# Patient Record
Sex: MALE | Race: OTHER | Employment: FULL TIME | URBAN - METROPOLITAN AREA
[De-identification: names, ages, dates, MRNs, and addresses within clinical notes are randomized per-mention and may not be internally consistent; named-entity substitution may affect disease eponyms.]

---

## 2017-07-07 ENCOUNTER — OFFICE VISIT (OUTPATIENT)
Dept: NEUROLOGY | Age: 57
End: 2017-07-07

## 2017-07-07 VITALS
HEART RATE: 104 BPM | HEIGHT: 71 IN | OXYGEN SATURATION: 95 % | BODY MASS INDEX: 34.16 KG/M2 | RESPIRATION RATE: 18 BRPM | WEIGHT: 244 LBS | SYSTOLIC BLOOD PRESSURE: 130 MMHG | DIASTOLIC BLOOD PRESSURE: 72 MMHG

## 2017-07-07 DIAGNOSIS — G25.0 ESSENTIAL TREMOR: Primary | ICD-10-CM

## 2017-07-07 RX ORDER — PRIMIDONE 50 MG/1
50 TABLET ORAL
Qty: 90 TAB | Refills: 3 | Status: SHIPPED | OUTPATIENT
Start: 2017-07-07 | End: 2017-07-31 | Stop reason: SDUPTHER

## 2017-07-07 NOTE — PATIENT INSTRUCTIONS
10 [facility]   Statement to Patients  April 1, 2014      In an effort to ensure the large volume of patient prescription refills is processed in the most efficient and expeditious manner, we are asking our patients to assist us by calling your Pharmacy for all prescription refills, this will include also your  Mail Order Pharmacy. The pharmacy will contact our office electronically to continue the refill process. Please do not wait until the last minute to call your pharmacy. We need at least 48 hours (2days) to fill prescriptions. We also encourage you to call your pharmacy before going to  your prescription to make sure it is ready. With regard to controlled substance prescription refill requests (narcotic refills) that need to be picked up at our office, we ask your cooperation by providing us with at least 72 hours (3days) notice that you will need a refill. We will not refill narcotic prescription refill requests after 4:00pm on any weekday, Monday through Thursday, or after 2:00pm on Fridays, or on the weekends. We encourage everyone to explore another way of getting your prescription refill request processed using Appscend, our patient web portal through our electronic medical record system. Appscend is an efficient and effective way to communicate your medication request directly to the office and  downloadable as an johann on your smart phone . Appscend also features a review functionality that allows you to view your medication list as well as leave messages for your physician. Are you ready to get connected? If so please review the attatched instructions or speak to any of our staff to get you set up right away! Thank you so much for your cooperation. Should you have any questions please contact our Practice Administrator.     The Physicians and Staff,  [facility]           Please bring all medication bottles, including vitamins, supplements and any over-the-counter medications, to your next office visit.

## 2017-07-07 NOTE — MR AVS SNAPSHOT
Visit Information Date & Time Provider Department Dept. Phone Encounter #  
 7/7/2017  1:40 PM Jamie Miguel MD [facility] at [place] [id number] [phone] Follow-up Instructions Return in about 1 year (around 7/7/2018). Upcoming Health Maintenance Date Due Hepatitis C Screening 1960 DTaP/Tdap/Td series (1 - Tdap) 5/7/1979 FOBT Q 1 YEAR AGE 50-75 5/7/2008 INFLUENZA AGE 9 TO ADULT 8/1/2017 Allergies as of 7/7/2017  Review Complete On: 7/7/2017 By: Jamie Miguel MD  
 No Known Allergies Current Immunizations  Never Reviewed No immunizations on file. Not reviewed this visit Vitals BP Pulse Resp Height(growth percentile) Weight(growth percentile) SpO2  
 130/72 (!) 104 18 5' 11\" (1.803 m) 244 lb (110.7 kg) 95% BMI Smoking Status 34.03 kg/m2 Former Smoker Vitals History BMI and BSA Data Body Mass Index Body Surface Area 34.03 kg/m 2 2.35 m 2 Preferred Pharmacy Pharmacy Name Phone [id number] [place], [address] [phone] Your Updated Medication List  
  
   
This list is accurate as of: 7/7/17  2:11 PM.  Always use your most recent med list.  
  
  
  
  
 aspirin 325 mg tablet Commonly known as:  ASPIRIN Take 325 mg by mouth daily. CIALIS 5 mg tablet Generic drug:  tadalafil Take 5 mg by mouth daily. clotrimazole 1 % topical cream  
Commonly known as:  Tom Huitron Apply  to affected area two (2) times a day. fish oil-dha-epa 1,200-144-216 mg Cap Take  by mouth.  
  
 levocetirizine 5 mg tablet Commonly known as:  Bridgett Ply Take 5 mg by mouth nightly. melatonin Tab tablet Take 5 mg by mouth nightly. NASONEX 50 mcg/actuation nasal spray Generic drug:  mometasone 2 Sprays daily. PANDEL 0.1 % topical cream  
Generic drug:  hydrocortisone probutate Apply  to affected area two (2) times daily as needed for Itching. primidone 50 mg tablet Commonly known as: MYSOLINE Take 1 Tab by mouth nightly. SIMCOR 1,000-40 mg Tm24 Generic drug:  Niacin-Simvastatin Take  by mouth nightly. valsartan 80 mg tab 80 mg, hydrochlorothiazide 12.5 mg cap 12.5 mg Take 1 Dose by mouth every morning. VIMOVO 500-20 mg Tbid Generic drug:  Naproxen-Esomeprazole Mag Take  by mouth two (2) times a day. VITAMIN D3 1,000 unit tablet Generic drug:  cholecalciferol Take 1,000 Units by mouth daily. XANAX 0.25 mg tablet Generic drug:  ALPRAZolam  
Take 0.25 mg by mouth as needed for Anxiety. ZOCOR 10 mg tablet Generic drug:  simvastatin Take  by mouth nightly. Prescriptions Sent to Pharmacy Refills  
 primidone (MYSOLINE) 50 mg tablet 3 Sig: Take 1 Tab by mouth nightly. Class: Normal  
 Pharmacy: [id number]- [address]  #: [phone] Route: Oral  
  
Follow-up Instructions Return in about 1 year (around 7/7/2018). Patient Instructions PRESCRIPTION REFILL POLICY [facility] Statement to Patients April 1, 2014 In an effort to ensure the large volume of patient prescription refills is processed in the most efficient and expeditious manner, we are asking our patients to assist us by calling your Pharmacy for all prescription refills, this will include also your  Mail Order Pharmacy. The pharmacy will contact our office electronically to continue the refill process. Please do not wait until the last minute to call your pharmacy. We need at least 48 hours (2days) to fill prescriptions. We also encourage you to call your pharmacy before going to  your prescription to make sure it is ready.   
 
With regard to controlled substance prescription refill requests (narcotic refills) that need to be picked up at our office, we ask your cooperation by providing us with at least 72 hours (3days) notice that you will need a refill. We will not refill narcotic prescription refill requests after 4:00pm on any weekday, Monday through Thursday, or after 2:00pm on Fridays, or on the weekends. We encourage everyone to explore another way of getting your prescription refill request processed using DeepField, our patient web portal through our electronic medical record system. DeepField is an efficient and effective way to communicate your medication request directly to the office and  downloadable as an johann on your smart phone . DeepField also features a review functionality that allows you to view your medication list as well as leave messages for your physician. Are you ready to get connected? If so please review the attatched instructions or speak to any of our staff to get you set up right away! Thank you so much for your cooperation. Should you have any questions please contact our Practice Administrator. The Physicians and Staff,  [facility] Please bring all medication bottles, including vitamins, supplements and any over-the-counter medications, to your next office visit. Introducing [facility]! [facility] introduces DeepField patient portal. Now you can access parts of your medical record, email your doctor's office, and request medication refills online. 1. In your internet browser, go to [email or web address] 2. Click on the First Time User? Click Here link in the Sign In box. You will see the New Member Sign Up page. 3. Enter your DeepField Access Code exactly as it appears below. You will not need to use this code after youve completed the sign-up process. If you do not sign up before the expiration date, you must request a new code. · DeepField Access Code: [email or web address] Expires: 10/5/2017  1:39 PM 
 
 4. Enter the last four digits of your Social Security Number (xxxx) and Date of Birth (mm/dd/yyyy) as indicated and click Submit. You will be taken to the next sign-up page. 5. Create a Factorli ID. This will be your Factorli login ID and cannot be changed, so think of one that is secure and easy to remember. 6. Create a Factorli password. You can change your password at any time. 7. Enter your Password Reset Question and Answer. This can be used at a later time if you forget your password. 8. Enter your e-mail address. You will receive e-mail notification when new information is available in [address] 9. Click Sign Up. You can now view and download portions of your medical record. 10. Click the Download Summary menu link to download a portable copy of your medical information. If you have questions, please visit the Frequently Asked Questions section of the Factorli website. Remember, Factorli is NOT to be used for urgent needs. For medical emergencies, dial 911. Now available from your iPhone and Android! Please provide this summary of care documentation to your next provider. Your primary care clinician is listed as BALKE BRENNAN. If you have any questions after today's visit, please call [phone].

## 2017-07-31 RX ORDER — PRIMIDONE 50 MG/1
50 TABLET ORAL
Qty: 90 TAB | Refills: 3 | Status: SHIPPED | OUTPATIENT
Start: 2017-07-31 | End: 2017-10-23 | Stop reason: SDUPTHER

## 2017-10-23 RX ORDER — PRIMIDONE 50 MG/1
50 TABLET ORAL
Qty: 90 TAB | Refills: 3 | Status: SHIPPED | OUTPATIENT
Start: 2017-10-23 | End: 2018-09-06 | Stop reason: SDUPTHER

## 2018-09-07 RX ORDER — PRIMIDONE 50 MG/1
TABLET ORAL
Qty: 90 TAB | Refills: 0 | Status: SHIPPED | OUTPATIENT
Start: 2018-09-07 | End: 2018-11-26 | Stop reason: SDUPTHER

## 2018-09-07 NOTE — TELEPHONE ENCOUNTER
I left message for patient informing him that his med has been sent in and that he needs a follow up appt.

## 2018-11-26 RX ORDER — PRIMIDONE 50 MG/1
TABLET ORAL
Qty: 90 TAB | Refills: 0 | Status: SHIPPED | OUTPATIENT
Start: 2018-11-26 | End: 2018-12-21 | Stop reason: SDUPTHER

## 2018-12-21 ENCOUNTER — OFFICE VISIT (OUTPATIENT)
Dept: NEUROLOGY | Age: 58
End: 2018-12-21

## 2018-12-21 VITALS
RESPIRATION RATE: 18 BRPM | SYSTOLIC BLOOD PRESSURE: 122 MMHG | DIASTOLIC BLOOD PRESSURE: 82 MMHG | OXYGEN SATURATION: 96 % | HEIGHT: 71 IN | HEART RATE: 64 BPM | WEIGHT: 247.2 LBS | BODY MASS INDEX: 34.61 KG/M2

## 2018-12-21 DIAGNOSIS — G25.0 ESSENTIAL TREMOR: Primary | ICD-10-CM

## 2018-12-21 RX ORDER — PRIMIDONE 50 MG/1
TABLET ORAL
Qty: 90 TAB | Refills: 3 | Status: SHIPPED | OUTPATIENT
Start: 2018-12-21 | End: 2019-11-22 | Stop reason: SDUPTHER

## 2018-12-21 NOTE — PATIENT INSTRUCTIONS
A Healthy Lifestyle: Care Instructions  Your Care Instructions    A healthy lifestyle can help you feel good, stay at a healthy weight, and have plenty of energy for both work and play. A healthy lifestyle is something you can share with your whole family. A healthy lifestyle also can lower your risk for serious health problems, such as high blood pressure, heart disease, and diabetes. You can follow a few steps listed below to improve your health and the health of your family. Follow-up care is a key part of your treatment and safety. Be sure to make and go to all appointments, and call your doctor if you are having problems. It's also a good idea to know your test results and keep a list of the medicines you take. How can you care for yourself at home? · Do not eat too much sugar, fat, or fast foods. You can still have dessert and treats now and then. The goal is moderation. · Start small to improve your eating habits. Pay attention to portion sizes, drink less juice and soda pop, and eat more fruits and vegetables. ? Eat a healthy amount of food. A 3-ounce serving of meat, for example, is about the size of a deck of cards. Fill the rest of your plate with vegetables and whole grains. ? Limit the amount of soda and sports drinks you have every day. Drink more water when you are thirsty. ? Eat at least 5 servings of fruits and vegetables every day. It may seem like a lot, but it is not hard to reach this goal. A serving or helping is 1 piece of fruit, 1 cup of vegetables, or 2 cups of leafy, raw vegetables. Have an apple or some carrot sticks as an afternoon snack instead of a candy bar. Try to have fruits and/or vegetables at every meal.  · Make exercise part of your daily routine. You may want to start with simple activities, such as walking, bicycling, or slow swimming. Try to be active 30 to 60 minutes every day. You do not need to do all 30 to 60 minutes all at once.  For example, you can exercise 3 times a day for 10 or 20 minutes. Moderate exercise is safe for most people, but it is always a good idea to talk to your doctor before starting an exercise program.  · Keep moving. Anatoliy Bach the lawn, work in the garden, or PowerPot. Take the stairs instead of the elevator at work. · If you smoke, quit. People who smoke have an increased risk for heart attack, stroke, cancer, and other lung illnesses. Quitting is hard, but there are ways to boost your chance of quitting tobacco for good. ? Use nicotine gum, patches, or lozenges. ? Ask your doctor about stop-smoking programs and medicines. ? Keep trying. In addition to reducing your risk of diseases in the future, you will notice some benefits soon after you stop using tobacco. If you have shortness of breath or asthma symptoms, they will likely get better within a few weeks after you quit. · Limit how much alcohol you drink. Moderate amounts of alcohol (up to 2 drinks a day for men, 1 drink a day for women) are okay. But drinking too much can lead to liver problems, high blood pressure, and other health problems. Family health  If you have a family, there are many things you can do together to improve your health. · Eat meals together as a family as often as possible. · Eat healthy foods. This includes fruits, vegetables, lean meats and dairy, and whole grains. · Include your family in your fitness plan. Most people think of activities such as jogging or tennis as the way to fitness, but there are many ways you and your family can be more active. Anything that makes you breathe hard and gets your heart pumping is exercise. Here are some tips:  ? Walk to do errands or to take your child to school or the bus.  ? Go for a family bike ride after dinner instead of watching TV. Where can you learn more? Go to http://addie-arianna.info/. Enter C728 in the search box to learn more about \"A Healthy Lifestyle: Care Instructions. \"  Current as of: December 7, 2017  Content Version: 11.8  © 6677-2613 Healthwise, Incorporated. Care instructions adapted under license by [employer] (which disclaims liability or warranty for this information). If you have questions about a medical condition or this instruction, always ask your healthcare professional. [address] any warranty or liability for your use of this information.

## 2018-12-21 NOTE — PROGRESS NOTES
Neurology Progress Note      HISTORY PROVIDED BY: patient    Chief Complaint:   Chief Complaint   Patient presents with    Tremors      Subjective:   Pt is a 59 y.o. right handed male last seen in clinic on 17 in f/u for essential tremor, presenting with progressive hand tremors noticed with activity, with family history of tremors in his father, well controlled on Primidone. Exam with mild head tremor, bilateral end point tremors, o/w nonfocal and unremarkable. Continued primidone 50 mg nightly. He returns for delayed f/u. He is doing well. Doesn't feel he needs to increase Primidone. Very rarely he feels tremors are worse. He missed 3-4 days of Primidone in early December, did not notice a huge change in tremors, but was distracted due to his mother's passing. No new medical problems or neurological complaints.      Past Medical History:   Diagnosis Date    Altered glucose metabolism     Arthritis     Environmental allergies     Erectile dysfunction     Essential tremor     Hypercholesterolemia     Hypertension     Insomnia     Vitamin D deficiency       Past Surgical History:   Procedure Laterality Date    HX RHINOPLASTY      Deviated septum    HX TONSILLECTOMY        Social History     Socioeconomic History    Marital status:      Spouse name: Not on file    Number of children: Not on file    Years of education: Not on file    Highest education level: Not on file   Social Needs    Financial resource strain: Not on file    Food insecurity - worry: Not on file    Food insecurity - inability: Not on file   [employer] needs - medical: Not on file   [employer] needs - non-medical: Not on file   Occupational History    Occupation: Sales   Tobacco Use    Smoking status: Former Smoker     Packs/day: 1.00     Years: 40.00     Pack years: 40.00     Last attempt to quit: 2013     Years since quittin.4    Smokeless tobacco: Current User     Last attempt to quit: 2013    Tobacco comment: E CIGS OCCASIONLY   Substance and Sexual Activity    Alcohol use: Yes     Comment: RARELY    Drug use: No    Sexual activity: Not on file   Other Topics Concern    Not on file   Social History Narrative     lives in [place] with his wife     Family History   Problem Relation Age of Onset    Thyroid Disease Mother     Cancer Father         COLON  68years old,    Corrie Jurado Stroke Father     Dementia Father         diagnosed 67years old   Corrie Jurado Diabetes Father     Tremors Father     Diabetes Brother     Cancer Brother         Leukemia    No Known Problems Brother     No Known Problems Daughter          Objective:   ROS: Per HPI or PMH, o/w reviewed and neg    No Known Allergies     Meds:  Outpatient Medications Prior to Visit   Medication Sig Dispense Refill    primidone (MYSOLINE) 50 mg tablet TAKE 1 TABLET BY MOUTH  NIGHTLY 90 Tab 0    valsartan 80 mg tab 80 mg, hydrochlorothiazide 12.5 mg cap 12.5 mg Take 1 Dose by mouth every morning.  Naproxen-Esomeprazole Mag (VIMOVO) 500-20 mg TbID Take  by mouth two (2) times a day.  Niacin-Simvastatin [facility]) 1,000-40 mg TM24 Take  by mouth nightly.  levocetirizine (XYZAL) 5 mg tablet Take 5 mg by mouth nightly.  cholecalciferol (VITAMIN D3) 1,000 unit tablet Take 1,000 Units by mouth daily.  fish oil-dha-epa 1,200-144-216 mg cap Take  by mouth.  aspirin (ASPIRIN) 325 mg tablet Take 325 mg by mouth daily.  melatonin 5 mg tab Take 5 mg by mouth nightly.  mometasone (NASONEX) 50 mcg/actuation nasal spray 2 Sprays daily.  clotrimazole (LOTRIMIN) 1 % topical cream Apply  to affected area two (2) times a day.  hydrocortisone buteprate (PANDEL) 0.1 % topical cream Apply  to affected area two (2) times daily as needed for Itching.  tadalafil (CIALIS) 5 mg tablet Take 5 mg by mouth daily.  ALPRAZolam (XANAX) 0.25 mg tablet Take 0.25 mg by mouth as needed for Anxiety.  simvastatin (ZOCOR) 10 mg tablet Take  by mouth nightly. No facility-administered medications prior to visit. Imaging:  MRI Results (most recent):  No results found for this or any previous visit. CT Results (most recent):  Results from Hospital Encounter encounter on 11/20/13   CTA CHEST W WO CONT    Narrative **Final Report**       ICD Codes / Adm. Diagnosis: 794740   / Shoulder Pain    Examination:  CT CHEST ANGIOGRAPHY  - [id number] - Nov 20 2013  6:53AM  Accession No:  [id number]  Reason:  Chest pain R/O PE      REPORT:  Clinical indication: Chest pain and shortness of breath. Localizer obtained without contrast at the level of the pulmonary arteries. Fast injection rate of 80 cc Optiray-350 with review of the raw data and MIP   reconstructions. There is no pulmonary emboli. Bibasilar atelectasis. No parenchymal mass,   pericardial pleural effusion or shift or pneumothorax. IMPRESSION: No acute changes. Signing/Reading Doctor: Deirdre Ochoa ([id number])    Approved: Deirdre Ochoa ([id number])  Nov 20 2013  7:14AM                                      Reviewed records in [facility] and media tab today    Lab Review   Results for orders placed or performed during the hospital encounter of 48/80/80   METABOLIC PANEL, COMPREHENSIVE   Result Value Ref Range    Sodium 139 136 - 145 mmol/L    Potassium 4.2 3.5 - 5.1 mmol/L    Chloride 102 97 - 108 mmol/L    CO2 29 21 - 32 mmol/L    Anion gap 8 5 - 15 mmol/L    Glucose 87 65 - 100 mg/dL    BUN 14 6 - 20 MG/DL    Creatinine 0.55 0.45 - 1.15 MG/DL    BUN/Creatinine ratio 25 (H) 12 - 20      GFR est AA >60 >60 ml/min/1.73m2    GFR est non-AA >60 >60 ml/min/1.73m2    Calcium 9.6 8.5 - 10.1 MG/DL    Bilirubin, total 0.5 0.2 - 1.0 MG/DL    ALT (SGPT) 42 12 - 78 U/L    AST (SGOT) 19 15 - 37 U/L    Alk.  phosphatase 67 45 - 117 U/L    Protein, total 7.4 6.4 - 8.2 g/dL    Albumin 4.5 3.5 - 5.0 g/dL    Globulin 2.9 2.0 - 4.0 g/dL    A-G Ratio 1.6 1.1 - 2.2     CBC WITH AUTOMATED DIFF   Result Value Ref Range    WBC 5.3 4.1 - 11.1 K/uL    RBC 4.90 4. 10 - 5.70 M/uL    HGB 15.3 12.1 - 17.0 g/dL    HCT 43.8 36.6 - 50.3 %    MCV 89.4 80.0 - 99.0 FL    MCH 31.2 26.0 - 34.0 PG    MCHC 34.9 30.0 - 36.5 g/dL    RDW 13.3 11.5 - 14.5 %    PLATELET 314 761 - 024 K/uL    NEUTROPHILS 58 32 - 75 %    LYMPHOCYTES 33 12 - 49 %    MONOCYTES 8 5 - 13 %    EOSINOPHILS 1 0 - 7 %    BASOPHILS 0 0 - 1 %    ABS. NEUTROPHILS 3.0 1.8 - 8.0 K/UL    ABS. LYMPHOCYTES 1.7 0.8 - 3.5 K/UL    ABS. MONOCYTES 0.4 0.0 - 1.0 K/UL    ABS. EOSINOPHILS 0.1 0.0 - 0.4 K/UL    ABS. BASOPHILS 0.0 0.0 - 0.1 K/UL   URINALYSIS W/ REFLEX CULTURE   Result Value Ref Range    Color YELLOW/STRAW      Appearance CLEAR CLEAR      Specific gravity 1.014 1.003 - 1.030      pH (UA) 7.0 5.0 - 8.0      Protein NEGATIVE  NEG mg/dL    Glucose NEGATIVE  NEG mg/dL    Ketone NEGATIVE  NEG mg/dL    Bilirubin NEGATIVE  NEG      Blood NEGATIVE  NEG      Urobilinogen 0.2 0.2 - 1.0 EU/dL    Nitrites NEGATIVE  NEG      Leukocyte Esterase NEGATIVE  NEG      UA:UC IF INDICATED CULTURE NOT INDICATED BY UA RESULT CNI      WBC 0-4 0 - 4 /hpf    RBC 0-5 0 - 5 /hpf    Epithelial cells FEW FEW /lpf    Bacteria NEGATIVE  NEG /hpf    Hyaline cast 0-2 0 - 2 /lpf   EKG, 12 LEAD, INITIAL   Result Value Ref Range    Ventricular Rate 70 BPM    Atrial Rate 70 BPM    P-R Interval 176 ms    QRS Duration 90 ms    Q-T Interval 394 ms    QTC Calculation (Bezet) 425 ms    Calculated P Axis 7 degrees    Calculated R Axis -6 degrees    Calculated T Axis 6 degrees    Diagnosis       Normal sinus rhythm  Normal ECG  When compared with ECG of 20-NOV-2013 04:34,  No significant change was found  Confirmed by Quynh Head M.D., Domingo Monday ([id number]) on 8/17/2015 10:05:31 PM          Exam:  Visit Vitals  /82   Pulse 64   Resp 18   Ht 5' 11\" (1.803 m)   Wt 112.1 kg (247 lb 3.2 oz)   SpO2 96%   BMI 34.48 kg/m²     General:  Alert, cooperative, no distress. Head:  Normocephalic, without obvious abnormality, atraumatic. Respiratory:  Heart:   Non labored breathing  Regular rate and rhythm, no murmurs   Neck:   2+ carotids, no bruits   Extremities: Warm, no cyanosis or edema. Pulses: 2+ radial pulses. Neurologic:  MS: Alert and oriented x 4, speech intact. Language intact. Attention and fund of knowledge appropriate. Recent and remote memory intact. Cranial Nerves:  II: visual fields Full to confrontation   II: pupils PERRL   II: optic disc    III,VII: ptosis none   III,IV,VI: extraocular muscles  EOMI, no nystagmus or diplopia   V: facial light touch sensation     VII: facial muscle function   symmetric   VIII: hearing intact   IX: soft palate elevation  Elevates sym   XI: trapezius strength     XI: sternocleidomastoid strength    XII: tongue  midline     Motor: normal bulk and tone, head tremor, mild. Bilateral end point tremors. Strength: 5/5 throughout, no PD  Sensory:  Coordination: FTN and HTS intact, FREEMAN intact  Gait: normal gait, turns normally, normal arm swing  Reflexes: 2+ symmetric           Assessment/Plan   Pt is a 59 y.o. right handed male with essential tremor, presenting with progressive hand tremors noticed with activity, head tremor noticed on exam, with family history of tremors in his father, well controlled on Primidone. Exam with mild head tremor, bilateral end point tremors, o/w nonfocal and unremarkable. Continue primidone 50 mg nightly. Follow-up in 1 year, instructed to call in the interim with any questions or concerns. ICD-10-CM ICD-9-CM    1. Essential tremor G25.0 333.1        Signed:   Kiki Velasquez MD  12/21/2018

## 2018-12-21 NOTE — PROGRESS NOTES
Mr. Sivan Kelly is here today to follow up tremor. His symptoms are stable. Depression screening done on patient.

## 2019-11-19 RX ORDER — PRIMIDONE 50 MG/1
TABLET ORAL
Qty: 90 TAB | Refills: 3 | OUTPATIENT
Start: 2019-11-19

## 2019-11-22 ENCOUNTER — OFFICE VISIT (OUTPATIENT)
Dept: NEUROLOGY | Age: 59
End: 2019-11-22

## 2019-11-22 VITALS
HEIGHT: 71 IN | RESPIRATION RATE: 18 BRPM | HEART RATE: 91 BPM | DIASTOLIC BLOOD PRESSURE: 60 MMHG | SYSTOLIC BLOOD PRESSURE: 110 MMHG | WEIGHT: 251.2 LBS | BODY MASS INDEX: 35.17 KG/M2 | OXYGEN SATURATION: 97 %

## 2019-11-22 DIAGNOSIS — G25.0 ESSENTIAL TREMOR: Primary | ICD-10-CM

## 2019-11-22 RX ORDER — PRIMIDONE 50 MG/1
100 TABLET ORAL
Qty: 180 TAB | Refills: 3 | Status: SHIPPED | OUTPATIENT
Start: 2019-11-22 | End: 2020-11-06

## 2019-11-22 RX ORDER — FEXOFENADINE HCL 60 MG
TABLET ORAL
COMMUNITY
End: 2020-11-23

## 2019-11-22 NOTE — PROGRESS NOTES
Chief Complaint   Patient presents with    Follow-up     f/u tremors noted about the same large intake of caffine daily    Tremors     Visit Vitals  /60 (BP 1 Location: Right arm, BP Patient Position: Sitting)   Pulse 91   Resp 18   Ht 5' 11\" (1.803 m)   Wt 113.9 kg (251 lb 3.2 oz)   SpO2 97%   BMI 35.04 kg/m²

## 2019-11-22 NOTE — LETTER
11/22/19 Patient: Tyrell Rome YOB: 1960 Date of Visit: 11/22/2019 [address] [facility] [facility] [place] [address] VIA Facsimile: [phone] Dear [address] [place] [address],[facility] [address] [facility], MD, Thank you for referring Mr. Tyrell Rome to [address] [place] [address] for evaluation. My notes for this consultation are attached. If you have questions, please do not hesitate to call me. I look forward to following your patient along with you. Sincerely, Ethelle Dubin, MD

## 2019-11-22 NOTE — PROGRESS NOTES
Neurology Progress Note      HISTORY PROVIDED BY: patient    Chief Complaint:   Chief Complaint   Patient presents with    Follow-up     f/u tremors noted about the same large intake of caffine daily    Tremors      Subjective:   Pt is a 62 y.o. right handed male last seen in clinic on 12/21/18 for essential tremor, presenting with progressive hand tremors noticed with activity, head tremor noticed on exam, with family history of tremors in his father, well controlled on Primidone at last visit. Exam with mild head tremor, bilateral end point tremors, o/w nonfocal and unremarkable. Continued primidone 50 mg nightly. He returns for f/u. He feels like his tremor is worse, but notes he drinks a 20oz cup of coffee in AM and has another and then has caffeinated tea. He doesn't like water. He has noticed trouble with the pointer on the computer screen. His interested in increasing dose. No new health issues. No surgeries.      Past Medical History:   Diagnosis Date    Altered glucose metabolism     Arthritis     Environmental allergies     Erectile dysfunction     Essential tremor     Hypercholesterolemia     Hypertension     Insomnia     Vitamin D deficiency       Past Surgical History:   Procedure Laterality Date    HX RHINOPLASTY      Deviated septum    HX TONSILLECTOMY        Social History     Socioeconomic History    Marital status:      Spouse name: Not on file    Number of children: Not on file    Years of education: Not on file    Highest education level: Not on file   Occupational History    Occupation: Sales   Social Needs    Financial resource strain: Not on file    Food insecurity:     Worry: Not on file     Inability: Not on file    Transportation needs:     Medical: Not on file     Non-medical: Not on file   Tobacco Use    Smoking status: Former Smoker     Packs/day: 1.00     Years: 40.00     Pack years: 40.00     Last attempt to quit: 7/4/2013     Years since quitting: 6. 3    Smokeless tobacco: Current User     Last attempt to quit: 2013    Tobacco comment: E CIGS OCCASIONLY   Substance and Sexual Activity    Alcohol use: Yes     Comment: RARELY    Drug use: No    Sexual activity: Not on file   Lifestyle    Physical activity:     Days per week: Not on file     Minutes per session: Not on file    Stress: Not on file   Relationships    Social connections:     Talks on phone: Not on file     Gets together: Not on file     Attends Christian service: Not on file     Active member of club or organization: Not on file     Attends meetings of clubs or organizations: Not on file     Relationship status: Not on file    Intimate partner violence:     Fear of current or ex partner: Not on file     Emotionally abused: Not on file     Physically abused: Not on file     Forced sexual activity: Not on file   Other Topics Concern    Not on file   Social History Narrative     lives in [address] [place] [address] with his wife     Family History   Problem Relation Age of Onset    Thyroid Disease Mother    Radha Herrmann Other Mother         Dec 81yo, unknown cause    Cancer Father         COLON  68years old,     Stroke Father     Dementia Father         diagnosed 67years old   Radha Herrmann Diabetes Father     Tremors Father     Diabetes Brother     Cancer Brother         Leukemia    No Known Problems Brother     No Known Problems Daughter          Objective:   ROS: Per HPI or PMH, o/w reviewed and neg    No Known Allergies     Meds:  Outpatient Medications Prior to Visit   Medication Sig Dispense Refill    fexofenadine (ALLEGRA) 60 mg tablet Take  by mouth.  primidone (MYSOLINE) 50 mg tablet TAKE 1 TABLET BY MOUTH  NIGHTLY 90 Tab 3    valsartan 80 mg tab 80 mg, hydrochlorothiazide 12.5 mg cap 12.5 mg Take 1 Dose by mouth every morning.  Naproxen-Esomeprazole Mag (VIMOVO) 500-20 mg TbID Take  by mouth two (2) times a day.       cholecalciferol (VITAMIN D3) 1,000 unit tablet Take 1,000 Units by mouth daily.  fish oil-dha-epa 1,200-144-216 mg cap Take  by mouth.  aspirin (ASPIRIN) 325 mg tablet Take 325 mg by mouth daily.  melatonin 5 mg tab Take 5 mg by mouth nightly.  clotrimazole (LOTRIMIN) 1 % topical cream Apply  to affected area two (2) times a day.  hydrocortisone buteprate (PANDEL) 0.1 % topical cream Apply  to affected area two (2) times daily as needed for Itching.  tadalafil (CIALIS) 5 mg tablet Take 5 mg by mouth daily.  simvastatin (ZOCOR) 10 mg tablet Take  by mouth nightly.  Niacin-Simvastatin [facility]) 1,000-40 mg TM24 Take  by mouth nightly.  levocetirizine (XYZAL) 5 mg tablet Take 5 mg by mouth nightly.  mometasone (NASONEX) 50 mcg/actuation nasal spray 2 Sprays daily.  ALPRAZolam (XANAX) 0.25 mg tablet Take 0.25 mg by mouth as needed for Anxiety. No facility-administered medications prior to visit. Imaging:  MRI Results (most recent):  No results found for this or any previous visit. CT Results (most recent):  Results from Hospital Encounter encounter on 11/20/13   CTA CHEST W WO CONT    Narrative **Final Report**       ICD Codes / Adm. Diagnosis: 343718   / Shoulder Pain    Examination:  CT CHEST ANGIOGRAPHY  - [id number] - Nov 20 2013  6:53AM  Accession No:  [id number]  Reason:  Chest pain R/O PE      REPORT:  Clinical indication: Chest pain and shortness of breath. Localizer obtained without contrast at the level of the pulmonary arteries. Fast injection rate of 80 cc Optiray-350 with review of the raw data and MIP   reconstructions. There is no pulmonary emboli. Bibasilar atelectasis. No parenchymal mass,   pericardial pleural effusion or shift or pneumothorax. IMPRESSION: No acute changes.             Signing/Reading Doctor: Renuka Pires ([id number])    Approved: Renuka Pires ([id number])  Nov 20 2013  7:14AM                                      Reviewed records in [facility] tab today    Lab Review   Results for orders placed or performed during the hospital encounter of 80/35/78   METABOLIC PANEL, COMPREHENSIVE   Result Value Ref Range    Sodium 139 136 - 145 mmol/L    Potassium 4.2 3.5 - 5.1 mmol/L    Chloride 102 97 - 108 mmol/L    CO2 29 21 - 32 mmol/L    Anion gap 8 5 - 15 mmol/L    Glucose 87 65 - 100 mg/dL    BUN 14 6 - 20 MG/DL    Creatinine 0.55 0.45 - 1.15 MG/DL    BUN/Creatinine ratio 25 (H) 12 - 20      GFR est AA >60 >60 ml/min/1.73m2    GFR est non-AA >60 >60 ml/min/1.73m2    Calcium 9.6 8.5 - 10.1 MG/DL    Bilirubin, total 0.5 0.2 - 1.0 MG/DL    ALT (SGPT) 42 12 - 78 U/L    AST (SGOT) 19 15 - 37 U/L    Alk. phosphatase 67 45 - 117 U/L    Protein, total 7.4 6.4 - 8.2 g/dL    Albumin 4.5 3.5 - 5.0 g/dL    Globulin 2.9 2.0 - 4.0 g/dL    A-G Ratio 1.6 1.1 - 2.2     CBC WITH AUTOMATED DIFF   Result Value Ref Range    WBC 5.3 4.1 - 11.1 K/uL    RBC 4.90 4. 10 - 5.70 M/uL    HGB 15.3 12.1 - 17.0 g/dL    HCT 43.8 36.6 - 50.3 %    MCV 89.4 80.0 - 99.0 FL    MCH 31.2 26.0 - 34.0 PG    MCHC 34.9 30.0 - 36.5 g/dL    RDW 13.3 11.5 - 14.5 %    PLATELET 330 555 - 071 K/uL    NEUTROPHILS 58 32 - 75 %    LYMPHOCYTES 33 12 - 49 %    MONOCYTES 8 5 - 13 %    EOSINOPHILS 1 0 - 7 %    BASOPHILS 0 0 - 1 %    ABS. NEUTROPHILS 3.0 1.8 - 8.0 K/UL    ABS. LYMPHOCYTES 1.7 0.8 - 3.5 K/UL    ABS. MONOCYTES 0.4 0.0 - 1.0 K/UL    ABS. EOSINOPHILS 0.1 0.0 - 0.4 K/UL    ABS.  BASOPHILS 0.0 0.0 - 0.1 K/UL   URINALYSIS W/ REFLEX CULTURE   Result Value Ref Range    Color YELLOW/STRAW      Appearance CLEAR CLEAR      Specific gravity 1.014 1.003 - 1.030      pH (UA) 7.0 5.0 - 8.0      Protein NEGATIVE  NEG mg/dL    Glucose NEGATIVE  NEG mg/dL    Ketone NEGATIVE  NEG mg/dL    Bilirubin NEGATIVE  NEG      Blood NEGATIVE  NEG      Urobilinogen 0.2 0.2 - 1.0 EU/dL    Nitrites NEGATIVE  NEG      Leukocyte Esterase NEGATIVE  NEG      UA:UC IF INDICATED CULTURE NOT INDICATED BY UA RESULT CNI      WBC 0-4 0 - 4 /hpf    RBC 0-5 0 - 5 /hpf    Epithelial cells FEW FEW /lpf    Bacteria NEGATIVE  NEG /hpf    Hyaline cast 0-2 0 - 2 /lpf   EKG, 12 LEAD, INITIAL   Result Value Ref Range    Ventricular Rate 70 BPM    Atrial Rate 70 BPM    P-R Interval 176 ms    QRS Duration 90 ms    Q-T Interval 394 ms    QTC Calculation (Bezet) 425 ms    Calculated P Axis 7 degrees    Calculated R Axis -6 degrees    Calculated T Axis 6 degrees    Diagnosis       Normal sinus rhythm  Normal ECG  When compared with ECG of 20-NOV-2013 04:34,  No significant change was found  Confirmed by Dinora King M.D., Prudence Meals ([id number]) on 8/17/2015 10:05:31 PM          Exam:  Visit Vitals  /60 (BP 1 Location: Right arm, BP Patient Position: Sitting)   Pulse 91   Resp 18   Ht 5' 11\" (1.803 m)   Wt 113.9 kg (251 lb 3.2 oz)   SpO2 97%   BMI 35.04 kg/m²     General:  Alert, cooperative, no distress. Head:  Normocephalic, without obvious abnormality, atraumatic. Respiratory:  Heart:   Non labored breathing  Regular rate and rhythm, no murmurs   Neck:   2+ carotids, no bruits   Extremities: Warm, no cyanosis or edema. Pulses: 2+ radial pulses. Neurologic:  MS: Alert and oriented x 4, speech - mild vocal tremor. Language intact. Attention and fund of knowledge appropriate. Recent and remote memory intact. Cranial Nerves:  II: visual fields Full to confrontation   II: pupils PERRL   II: optic disc    III,VII: ptosis none   III,IV,VI: extraocular muscles  EOMI, no nystagmus or diplopia   V: facial light touch sensation     VII: facial muscle function   symmetric   VIII: hearing intact   IX: soft palate elevation  Elevates sym   XI: trapezius strength     XI: sternocleidomastoid strength    XII: tongue  midline     Motor: normal bulk and tone, head tremor, mild. Bilateral end point tremors.  Strength: 5/5 throughout, no PD  Sensory:  Coordination: FTN and FREEMAN intact  Gait: normal gait  Reflexes: 2+ symmetric           Assessment/Plan   Pt is a 62 y.o. right handed male with essential tremor, presenting with progressive hand tremors noticed with activity, head tremor noticed on exam, with family history of tremors in his father. Pt appreciating worsening of tremors. Exam with mild head tremor, slightly worse than it was and new mild vocal tremor, bilateral end point tremors, o/w nonfocal and unremarkable. Discussed weaning caffeine to decrease tremors, and for other health benefits. Increase primidone to 100 mg nightly or 50mg bid, whichever he prefers. Follow-up in 1 year, instructed to call in the interim with any questions or concerns. ICD-10-CM ICD-9-CM    1. Essential tremor G25.0 333.1        Signed:   Joel Boxer, MD  11/22/2019

## 2019-11-26 ENCOUNTER — TELEPHONE (OUTPATIENT)
Dept: NEUROLOGY | Age: 59
End: 2019-11-26

## 2019-11-26 NOTE — TELEPHONE ENCOUNTER
Pharmacy calling because the number they have for the pt is not a working number. They are unable to contact him to send his Primidone.

## 2019-11-27 NOTE — TELEPHONE ENCOUNTER
Called patient to notify [facility] mail pharmacy with updated information to receive Primidone.

## 2020-11-06 RX ORDER — PRIMIDONE 50 MG/1
TABLET ORAL
Qty: 180 TAB | Refills: 0 | Status: SHIPPED | OUTPATIENT
Start: 2020-11-06 | End: 2020-11-23 | Stop reason: SDUPTHER

## 2020-11-23 ENCOUNTER — OFFICE VISIT (OUTPATIENT)
Dept: NEUROLOGY | Age: 60
End: 2020-11-23
Payer: COMMERCIAL

## 2020-11-23 VITALS
WEIGHT: 250 LBS | HEIGHT: 71 IN | SYSTOLIC BLOOD PRESSURE: 134 MMHG | BODY MASS INDEX: 35 KG/M2 | OXYGEN SATURATION: 98 % | HEART RATE: 84 BPM | DIASTOLIC BLOOD PRESSURE: 82 MMHG

## 2020-11-23 DIAGNOSIS — G25.0 ESSENTIAL TREMOR: Primary | ICD-10-CM

## 2020-11-23 PROCEDURE — 99213 OFFICE O/P EST LOW 20 MIN: CPT | Performed by: PSYCHIATRY & NEUROLOGY

## 2020-11-23 RX ORDER — PRIMIDONE 50 MG/1
TABLET ORAL
Qty: 180 TAB | Refills: 3 | Status: SHIPPED | OUTPATIENT
Start: 2020-11-23 | End: 2021-11-23 | Stop reason: SDUPTHER

## 2020-11-23 RX ORDER — CETIRIZINE HCL 10 MG
TABLET ORAL
COMMUNITY
End: 2021-11-23

## 2020-11-23 RX ORDER — VALSARTAN AND HYDROCHLOROTHIAZIDE 80; 12.5 MG/1; MG/1
TABLET, FILM COATED ORAL
COMMUNITY
Start: 2020-09-30

## 2020-11-23 RX ORDER — IBUPROFEN AND FAMOTIDINE 800; 26.6 MG/1; MG/1
1 TABLET, COATED ORAL 3 TIMES DAILY
COMMUNITY
End: 2021-11-23

## 2020-11-23 NOTE — PROGRESS NOTES
Neurology Progress Note      HISTORY PROVIDED BY: patient    Chief Complaint:   Chief Complaint   Patient presents with    Follow-up     essential tremor \" I have not noticed any change, but my PCP seemed to think it seems better. \"      Subjective:   Pt is a 56 y.o. right handed male last seen 11/22/19 in f/u for essential tremor, presenting with progressive hand tremors noticed with activity, head tremor noticed on exam, with family history of tremors in his father. Pt appreciating worsening of tremors. Exam with mild head tremor, slightly worse than it was and new mild vocal tremor, bilateral end point tremors, o/w nonfocal and unremarkable. Discussed weaning caffeine to decrease tremors, and for other health benefits. Increased primidone to 100 mg nightly or 50mg bid, whichever he prefers. He returns for planned f/u. 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 He has been seeing his PCP every 6 months, and Dr. Estrellita Pal spontaneously mentioned that his tremors seem better. The pt states, he just doesn't notice them, not interfering with activities. He is taking the Primidone 100mg qhs. He has not noticed voice or head tremor.      Past Medical History:   Diagnosis Date    Altered glucose metabolism     Arthritis     Environmental allergies     Erectile dysfunction     Essential tremor     Hypercholesterolemia     Hypertension     Insomnia     Vitamin D deficiency       Past Surgical History:   Procedure Laterality Date    HX RHINOPLASTY      Deviated septum    HX TONSILLECTOMY        Social History     Socioeconomic History    Marital status:      Spouse name: Not on file    Number of children: Not on file    Years of education: Not on file    Highest education level: Not on file   Occupational History    Occupation: Sales   Social Needs    Financial resource strain: Not on file    Food insecurity     Worry: Not on file Inability: Not on file    Transportation needs     Medical: Not on file     Non-medical: Not on file   Tobacco Use    Smoking status: Former Smoker     Packs/day: 1.00     Years: 40.00     Pack years: 40.00     Last attempt to quit: 2013     Years since quittin.3    Smokeless tobacco: Current User     Last attempt to quit: 2013    Tobacco comment: E CIGS OCCASIONLY   Substance and Sexual Activity    Alcohol use: Yes     Comment: RARELY    Drug use: No    Sexual activity: Not on file   Lifestyle    Physical activity     Days per week: Not on file     Minutes per session: Not on file    Stress: Not on file   Relationships    Social connections     Talks on phone: Not on file     Gets together: Not on file     Attends Latter day service: Not on file     Active member of club or organization: Not on file     Attends meetings of clubs or organizations: Not on file     Relationship status: Not on file    Intimate partner violence     Fear of current or ex partner: Not on file     Emotionally abused: Not on file     Physically abused: Not on file     Forced sexual activity: Not on file   Other Topics Concern    Not on file   Social History Narrative     lives in [place] with his wife     Family History   Problem Relation Age of Onset    Thyroid Disease Mother    Ferhat.Frankel Other Mother         Dec 83yo, unknown cause    Cancer Father         COLON  68years old,     Stroke Father     Dementia Father         diagnosed 67years old   Ferhat.Frankel Diabetes Father     Tremors Father     Diabetes Brother     Cancer Brother         Leukemia    No Known Problems Brother     No Known Problems Daughter          Objective:   ROS: Per HPI or PMH, o/w reviewed and neg    No Known Allergies     Meds:  Outpatient Medications Prior to Visit   Medication Sig Dispense Refill    valsartan-hydroCHLOROthiazide (DIOVAN-HCT) 80-12.5 mg per tablet TAKE 1 TABLET BY MOUTH ONCE A DAY IN THE MORNING 90 DAYS      ibuprofen-famotidine (Duexis) 800-26.6 mg tab Take 1 Tab by mouth three (3) times daily.  primidone (MYSOLINE) 50 mg tablet TAKE 2 TABLETS NIGHTLY 180 Tab 0    Niacin-Simvastatin (SIMCOR) 1,000-40 mg TM24 Take  by mouth nightly.  levocetirizine (XYZAL) 5 mg tablet Take 5 mg by mouth nightly.  cholecalciferol (VITAMIN D3) 1,000 unit tablet Take 1,000 Units by mouth daily.  fish oil-dha-epa 1,200-144-216 mg cap Take  by mouth.  aspirin (ASPIRIN) 325 mg tablet Take 325 mg by mouth daily.  melatonin 5 mg tab Take 5 mg by mouth nightly.  mometasone (NASONEX) 50 mcg/actuation nasal spray 2 Sprays daily.  hydrocortisone buteprate (PANDEL) 0.1 % topical cream Apply  to affected area two (2) times daily as needed for Itching.  tadalafil (CIALIS) 5 mg tablet Take 5 mg by mouth daily.  ALPRAZolam (XANAX) 0.25 mg tablet Take 0.25 mg by mouth as needed for Anxiety.  fexofenadine (ALLEGRA) 60 mg tablet Take  by mouth.  valsartan 80 mg tab 80 mg, hydrochlorothiazide 12.5 mg cap 12.5 mg Take 1 Dose by mouth every morning.  Naproxen-Esomeprazole Mag (VIMOVO) 500-20 mg TbID Take  by mouth two (2) times a day.  clotrimazole (LOTRIMIN) 1 % topical cream Apply  to affected area two (2) times a day.  simvastatin (ZOCOR) 10 mg tablet Take  by mouth nightly. No facility-administered medications prior to visit. Imaging:  MRI Results (most recent):  No results found for this or any previous visit. CT Results (most recent):  Results from Hospital Encounter encounter on 11/20/13   CTA CHEST W WO CONT    Narrative **Final Report**       ICD Codes / Adm. Diagnosis: 289596   / Shoulder Pain    Examination:  CT CHEST ANGIOGRAPHY  - [id number] - Nov 20 2013  6:53AM  Accession No:  [id number]  Reason:  Chest pain R/O PE      REPORT:  Clinical indication: Chest pain and shortness of breath.     Localizer obtained without contrast at the level of the pulmonary arteries. Fast injection rate of 80 cc Optiray-350 with review of the raw data and MIP   reconstructions. There is no pulmonary emboli. Bibasilar atelectasis. No parenchymal mass,   pericardial pleural effusion or shift or pneumothorax. IMPRESSION: No acute changes. Signing/Reading Doctor: Tristan Lazar ([id number])    Approved: Tristan Lazar ([id number])  Nov 20 2013  7:14AM                                      Reviewed records in [address] [place] [address] tab today    Lab Review   Results for orders placed or performed during the hospital encounter of 39/59/17   METABOLIC PANEL, COMPREHENSIVE   Result Value Ref Range    Sodium 139 136 - 145 mmol/L    Potassium 4.2 3.5 - 5.1 mmol/L    Chloride 102 97 - 108 mmol/L    CO2 29 21 - 32 mmol/L    Anion gap 8 5 - 15 mmol/L    Glucose 87 65 - 100 mg/dL    BUN 14 6 - 20 MG/DL    Creatinine 0.55 0.45 - 1.15 MG/DL    BUN/Creatinine ratio 25 (H) 12 - 20      GFR est AA >60 >60 ml/min/1.73m2    GFR est non-AA >60 >60 ml/min/1.73m2    Calcium 9.6 8.5 - 10.1 MG/DL    Bilirubin, total 0.5 0.2 - 1.0 MG/DL    ALT (SGPT) 42 12 - 78 U/L    AST (SGOT) 19 15 - 37 U/L    Alk. phosphatase 67 45 - 117 U/L    Protein, total 7.4 6.4 - 8.2 g/dL    Albumin 4.5 3.5 - 5.0 g/dL    Globulin 2.9 2.0 - 4.0 g/dL    A-G Ratio 1.6 1.1 - 2.2     CBC WITH AUTOMATED DIFF   Result Value Ref Range    WBC 5.3 4.1 - 11.1 K/uL    RBC 4.90 4. 10 - 5.70 M/uL    HGB 15.3 12.1 - 17.0 g/dL    HCT 43.8 36.6 - 50.3 %    MCV 89.4 80.0 - 99.0 FL    MCH 31.2 26.0 - 34.0 PG    MCHC 34.9 30.0 - 36.5 g/dL    RDW 13.3 11.5 - 14.5 %    PLATELET 140 143 - 755 K/uL    NEUTROPHILS 58 32 - 75 %    LYMPHOCYTES 33 12 - 49 %    MONOCYTES 8 5 - 13 %    EOSINOPHILS 1 0 - 7 %    BASOPHILS 0 0 - 1 %    ABS. NEUTROPHILS 3.0 1.8 - 8.0 K/UL    ABS. LYMPHOCYTES 1.7 0.8 - 3.5 K/UL    ABS. MONOCYTES 0.4 0.0 - 1.0 K/UL    ABS. EOSINOPHILS 0.1 0.0 - 0.4 K/UL    ABS.  BASOPHILS 0.0 0.0 - 0.1 K/UL   URINALYSIS W/ REFLEX CULTURE Specimen: Urine   Result Value Ref Range    Color YELLOW/STRAW      Appearance CLEAR CLEAR      Specific gravity 1.014 1.003 - 1.030      pH (UA) 7.0 5.0 - 8.0      Protein NEGATIVE  NEG mg/dL    Glucose NEGATIVE  NEG mg/dL    Ketone NEGATIVE  NEG mg/dL    Bilirubin NEGATIVE  NEG      Blood NEGATIVE  NEG      Urobilinogen 0.2 0.2 - 1.0 EU/dL    Nitrites NEGATIVE  NEG      Leukocyte Esterase NEGATIVE  NEG      UA:UC IF INDICATED CULTURE NOT INDICATED BY UA RESULT CNI      WBC 0-4 0 - 4 /hpf    RBC 0-5 0 - 5 /hpf    Epithelial cells FEW FEW /lpf    Bacteria NEGATIVE  NEG /hpf    Hyaline cast 0-2 0 - 2 /lpf   EKG, 12 LEAD, INITIAL   Result Value Ref Range    Ventricular Rate 70 BPM    Atrial Rate 70 BPM    P-R Interval 176 ms    QRS Duration 90 ms    Q-T Interval 394 ms    QTC Calculation (Bezet) 425 ms    Calculated P Axis 7 degrees    Calculated R Axis -6 degrees    Calculated T Axis 6 degrees    Diagnosis       Normal sinus rhythm  Normal ECG  When compared with ECG of 20-NOV-2013 04:34,  No significant change was found  Confirmed by Angelica Wild M.D., [facility] ([id number]) on 8/17/2015 10:05:31 PM          Exam:  Visit Vitals  /82 (BP 1 Location: Left arm, BP Patient Position: Sitting)   Pulse 84   Ht 5' 11\" (1.803 m)   Wt 250 lb (113.4 kg)   SpO2 98%   BMI 34.87 kg/m²     General:  Alert, cooperative, no distress. Head:  Normocephalic, without obvious abnormality, atraumatic. Respiratory:  Heart:   Non labored breathing  Regular rate and rhythm, no murmurs   Neck:   2+ carotids, no bruits   Extremities: Warm, no cyanosis or edema. Pulses: 2+ radial pulses. Neurologic:  MS: Alert and oriented x 4, speech - no dysarthria. Language intact. Attention and fund of knowledge appropriate. Recent and remote memory intact.   Cranial Nerves:  II: visual fields Full to confrontation   II: pupils PERRL   II: optic disc    III,VII: ptosis none   III,IV,VI: extraocular muscles  EOMI, no nystagmus or diplopia   V: facial light touch sensation     VII: facial muscle function   symmetric   VIII: hearing intact   IX: soft palate elevation     XI: trapezius strength     XI: sternocleidomastoid strength    XII: tongue       Motor: normal bulk and tone, head tremor, voice tremor, mild. Bilateral end point tremors, mild. Strength: 5/5 throughout, no PD  Sensory:  Coordination: FTN and FREEMAN intact  Gait: normal gait, able to tandem walk  Reflexes: 2+ symmetric           Assessment/Plan   Pt is a 56 y.o. right handed male with essential tremor, presenting with progressive hand tremors noticed with activity, head tremor noticed on exam, and mild voice tremor. He has family history of tremors in his father. Exam with slight head, voice, and end point tremors, o/w nonfocal and unremarkable. Pt pleased with current tremor control. Continue Primidone 100 mg nightly. Follow-up in 1 year, instructed to call in the interim with any questions or concerns. ICD-10-CM ICD-9-CM    1. Essential tremor  G25.0 333.1        Signed:   Pablo Jaime MD  11/23/2020

## 2020-11-23 NOTE — LETTER
11/23/20 Patient: Lizeth Covarrubias YOB: 1960 Date of Visit: 11/23/2020 [address] [facility],[address], [address] [facility],[address] [place] [id number] VIA Facsimile: [phone] Dear [address] [place] [address],[address], MD, Thank you for referring Mr. Lizeth Covarrubias to [address] [place] [address] for evaluation. My notes for this consultation are attached. If you have questions, please do not hesitate to call me. I look forward to following your patient along with you. Sincerely, Ashley Acharya MD

## 2020-11-23 NOTE — PROGRESS NOTES
Chief Complaint   Patient presents with    Follow-up     essential tremor \" I have not noticed any change, but my PCP seemed to think it seems better. \"     Visit Vitals  /82 (BP 1 Location: Left arm, BP Patient Position: Sitting)   Pulse 84   Ht 5' 11\" (1.803 m)   Wt 250 lb (113.4 kg)   SpO2 98%   BMI 34.87 kg/m²

## 2021-01-29 NOTE — PROGRESS NOTES
Neurology Progress Note      HISTORY PROVIDED BY: patient    Chief Complaint:   Chief Complaint   Patient presents with    Tremors     f/u      Subjective:    Shree Esquivel is a 59 y.o. right handed male last seen in clinic at [facility] on 16 in follow-up for progressive hand tremors noticed with activity, with family history of tremors in his father, consistent with essential tremor. No tremors are seen on exam otherwise exam was nonfocal and unremarkable. Continued primidone 50 mg nightly. Instructed to follow-up in 1 year. He returns for planned follow-up. Still feels like his tremors are well controlled on Primidone 50mg qhs. No trouble using a mouse at work. No new complaints. No new medical issues since last visit.      Past Medical History:   Diagnosis Date    Altered glucose metabolism     Arthritis     Environmental allergies     Erectile dysfunction     Essential tremor     Hypercholesterolemia     Hypertension     Insomnia     Vitamin D deficiency       Past Surgical History:   Procedure Laterality Date    HX RHINOPLASTY      Deviated septum    HX TONSILLECTOMY        Social History     Social History    Marital status:      Spouse name: N/A    Number of children: N/A    Years of education: N/A     Occupational History    Sales      Social History Main Topics    Smoking status: Former Smoker     Packs/day: 1.00     Years: 40.00     Quit date: 2013    Smokeless tobacco: Current User     Last attempt to quit: 2013      Comment: E CIGS OCCASIONLY    Alcohol use Yes      Comment: RARELY    Drug use: No    Sexual activity: Not on file     Other Topics Concern    Not on file     Social History Narrative     lives in [place] with his wife     Family History   Problem Relation Age of Onset    Thyroid Disease Mother     Cancer Father      COLON  68years old,    [de-identified] Stroke Father     Dementia Father      diagnosed 67years old    Diabetes Father     Tremors Father     Diabetes Brother     Cancer Brother      Leukemia    No Known Problems Brother     No Known Problems Daughter          Objective:   Review of Systems   Constitutional: Negative. HENT: Negative. Eyes: Negative. Respiratory:        Snoring     Cardiovascular: Negative. Gastrointestinal: Negative. Genitourinary: Negative. Musculoskeletal: Negative. Skin: Negative. Neurological: Positive for tremors. Endo/Heme/Allergies: Negative. Psychiatric/Behavioral: Negative. No Known Allergies     Meds:  Outpatient Medications Prior to Visit   Medication Sig Dispense Refill    valsartan 80 mg tab 80 mg, hydrochlorothiazide 12.5 mg cap 12.5 mg Take 1 Dose by mouth every morning.  Naproxen-Esomeprazole Mag (VIMOVO) 500-20 mg TbID Take  by mouth two (2) times a day.  Niacin-Simvastatin [facility] BILOX) 1,000-40 mg TM24 Take  by mouth nightly.  levocetirizine (XYZAL) 5 mg tablet Take 5 mg by mouth nightly.  primidone (MYSOLINE) 50 mg tablet Take 50 mg by mouth nightly.  cholecalciferol (VITAMIN D3) 1,000 unit tablet Take 1,000 Units by mouth daily.  fish oil-dha-epa 1,200-144-216 mg cap Take  by mouth.  aspirin (ASPIRIN) 325 mg tablet Take 325 mg by mouth daily.  melatonin 5 mg tab Take 5 mg by mouth nightly.  mometasone (NASONEX) 50 mcg/actuation nasal spray 2 Sprays daily.  clotrimazole (LOTRIMIN) 1 % topical cream Apply  to affected area two (2) times a day.  hydrocortisone buteprate (PANDEL) 0.1 % topical cream Apply  to affected area two (2) times daily as needed for Itching.  tadalafil (CIALIS) 5 mg tablet Take 5 mg by mouth daily.  ALPRAZolam (XANAX) 0.25 mg tablet Take 0.25 mg by mouth as needed for Anxiety.  simvastatin (ZOCOR) 10 mg tablet Take  by mouth nightly. No facility-administered medications prior to visit.         Imaging:  MRI Results (most recent):  No results found for this or any previous visit. CT Results (most recent):    Results from Hospital Encounter encounter on 11/20/13   CTA CHEST W WO CONT   Narrative **Final Report**      ICD Codes / Adm. Diagnosis: 910465   / Shoulder Pain    Examination:  CT CHEST ANGIOGRAPHY  - [id number] - Nov 20 2013  6:53AM  Accession No:  [id number]  Reason:  Chest pain R/O PE      REPORT:  Clinical indication: Chest pain and shortness of breath. Localizer obtained without contrast at the level of the pulmonary arteries. Fast injection rate of 80 cc Optiray-350 with review of the raw data and MIP   reconstructions. There is no pulmonary emboli. Bibasilar atelectasis. No parenchymal mass,   pericardial pleural effusion or shift or pneumothorax. IMPRESSION: No acute changes. Signing/Reading Doctor: Berny Flowers ([id number])    Approved: Berny Flowers ([id number])  Nov 20 2013  7:14AM                                      Reviewed records in [facility] and media tab today    Lab Review   Results for orders placed or performed during the hospital encounter of 97/12/66   METABOLIC PANEL, COMPREHENSIVE   Result Value Ref Range    Sodium 139 136 - 145 mmol/L    Potassium 4.2 3.5 - 5.1 mmol/L    Chloride 102 97 - 108 mmol/L    CO2 29 21 - 32 mmol/L    Anion gap 8 5 - 15 mmol/L    Glucose 87 65 - 100 mg/dL    BUN 14 6 - 20 MG/DL    Creatinine 0.55 0.45 - 1.15 MG/DL    BUN/Creatinine ratio 25 (H) 12 - 20      GFR est AA >60 >60 ml/min/1.73m2    GFR est non-AA >60 >60 ml/min/1.73m2    Calcium 9.6 8.5 - 10.1 MG/DL    Bilirubin, total 0.5 0.2 - 1.0 MG/DL    ALT (SGPT) 42 12 - 78 U/L    AST (SGOT) 19 15 - 37 U/L    Alk. phosphatase 67 45 - 117 U/L    Protein, total 7.4 6.4 - 8.2 g/dL    Albumin 4.5 3.5 - 5.0 g/dL    Globulin 2.9 2.0 - 4.0 g/dL    A-G Ratio 1.6 1.1 - 2.2     CBC WITH AUTOMATED DIFF   Result Value Ref Range    WBC 5.3 4.1 - 11.1 K/uL    RBC 4.90 4. 10 - 5.70 M/uL    HGB 15.3 12.1 - 17.0 g/dL    HCT 43.8 36.6 - 50.3 %    MCV 89.4 80.0 - 99.0 FL    MCH 31.2 26.0 - 34.0 PG    MCHC 34.9 30.0 - 36.5 g/dL    RDW 13.3 11.5 - 14.5 %    PLATELET 800 070 - 460 K/uL    NEUTROPHILS 58 32 - 75 %    LYMPHOCYTES 33 12 - 49 %    MONOCYTES 8 5 - 13 %    EOSINOPHILS 1 0 - 7 %    BASOPHILS 0 0 - 1 %    ABS. NEUTROPHILS 3.0 1.8 - 8.0 K/UL    ABS. LYMPHOCYTES 1.7 0.8 - 3.5 K/UL    ABS. MONOCYTES 0.4 0.0 - 1.0 K/UL    ABS. EOSINOPHILS 0.1 0.0 - 0.4 K/UL    ABS. BASOPHILS 0.0 0.0 - 0.1 K/UL   URINALYSIS W/ REFLEX CULTURE   Result Value Ref Range    Color YELLOW/STRAW      Appearance CLEAR CLEAR      Specific gravity 1.014 1.003 - 1.030      pH (UA) 7.0 5.0 - 8.0      Protein NEGATIVE  NEG mg/dL    Glucose NEGATIVE  NEG mg/dL    Ketone NEGATIVE  NEG mg/dL    Bilirubin NEGATIVE  NEG      Blood NEGATIVE  NEG      Urobilinogen 0.2 0.2 - 1.0 EU/dL    Nitrites NEGATIVE  NEG      Leukocyte Esterase NEGATIVE  NEG      UA:UC IF INDICATED CULTURE NOT INDICATED BY UA RESULT CNI      WBC 0-4 0 - 4 /hpf    RBC 0-5 0 - 5 /hpf    Epithelial cells FEW FEW /lpf    Bacteria NEGATIVE  NEG /hpf    Hyaline cast 0-2 0 - 2 /lpf   EKG, 12 LEAD, INITIAL   Result Value Ref Range    Ventricular Rate 70 BPM    Atrial Rate 70 BPM    P-R Interval 176 ms    QRS Duration 90 ms    Q-T Interval 394 ms    QTC Calculation (Bezet) 425 ms    Calculated P Axis 7 degrees    Calculated R Axis -6 degrees    Calculated T Axis 6 degrees    Diagnosis       Normal sinus rhythm  Normal ECG  When compared with ECG of 20-NOV-2013 04:34,  No significant change was found  Confirmed by Caden Lafleur M.D., Monica Lopez ([id number]) on 8/17/2015 10:05:31 PM          Exam:  Visit Vitals    /72    Pulse (!) 104    Resp 18    Ht 5' 11\" (1.803 m)    Wt 110.7 kg (244 lb)    SpO2 95%    BMI 34.03 kg/m2     General:  Alert, cooperative, no distress. Head:  Normocephalic, without obvious abnormality, atraumatic.    Respiratory:  Heart:   Non labored breathing  Regular rate and rhythm, no murmurs   Neck:   2+ carotids, no bruits Extremities: Warm, no cyanosis or edema. Pulses: 2+ radial pulses. Neurologic:  MS: Alert and oriented x 4, speech intact. Language intact. Attention and fund of knowledge appropriate. Recent and remote memory intact. Cranial Nerves:  II: visual fields Full to confrontation   II: pupils    II: optic disc    III,VII: ptosis none   III,IV,VI: extraocular muscles  EOMI, no nystagmus or diplopia   V: facial light touch sensation     VII: facial muscle function   symmetric   VIII: hearing intact   IX: soft palate elevation     XI: trapezius strength     XI: sternocleidomastoid strength    XII: tongue       Motor: normal bulk and tone, head tremor, mild. Bilateral end point tremors. Strength: 5/5 throughout, no PD  Sensory:  Coordination: FTN and HTS intact, FREEMAN intact  Gait: normal gait, turns normally, normal arm swing  Reflexes: 2+ symmetric           Assessment/Plan   Pt is a 59 y.o. right handed male with essential tremor, presenting with progressive hand tremors noticed with activity, with family history of tremors in his father, well controlled on Primidone. Exam with mild head tremor, bilateral end point tremors, o/w nonfocal and unremarkable. Continue primidone 50 mg nightly. Follow-up in 1 year, instructed to call in the interim with any questions or concerns. ICD-10-CM ICD-9-CM    1. Essential tremor G25.0 333.1        Signed:   Elvia Cabral MD  7/7/2017 Patient admitted with Cholelithiasis and acute cholecystitis. Radiology studies reviewed. Tender RUQ abdomen. Laparoscopic cholecystectomy possible open was discussed. The potential for bleeding, CBD injury, bowel injury and other related complications were discussed. All the questions were answered.  Informed consent for laparoscopic cholecystectomy possible open surgery was obtained

## 2021-11-23 ENCOUNTER — OFFICE VISIT (OUTPATIENT)
Dept: NEUROLOGY | Age: 61
End: 2021-11-23
Payer: COMMERCIAL

## 2021-11-23 VITALS
HEART RATE: 97 BPM | BODY MASS INDEX: 34.02 KG/M2 | SYSTOLIC BLOOD PRESSURE: 130 MMHG | OXYGEN SATURATION: 98 % | DIASTOLIC BLOOD PRESSURE: 70 MMHG | HEIGHT: 72 IN | WEIGHT: 251.2 LBS

## 2021-11-23 DIAGNOSIS — G25.0 ESSENTIAL TREMOR: Primary | ICD-10-CM

## 2021-11-23 PROCEDURE — 99213 OFFICE O/P EST LOW 20 MIN: CPT | Performed by: PSYCHIATRY & NEUROLOGY

## 2021-11-23 RX ORDER — PRIMIDONE 50 MG/1
TABLET ORAL
Qty: 180 TABLET | Refills: 3 | Status: SHIPPED | OUTPATIENT
Start: 2021-11-23 | End: 2022-10-03 | Stop reason: SDUPTHER

## 2021-11-23 NOTE — LETTER
11/23/2021    Patient: Solomon العراقي   YOB: 1960   Date of Visit: 11/23/2021     [facility] [address]  [address] [place], [place] [id number]  Via Fax: [phone]    Dear Eric Mansfield MD,      Thank you for referring Mr. Solomon العراقي to [facility] for evaluation. My notes for this consultation are attached. If you have questions, please do not hesitate to call me. I look forward to following your patient along with you.       Sincerely,    Sada Dent MD

## 2021-11-23 NOTE — PROGRESS NOTES
Neurology Progress Note      HISTORY PROVIDED BY: patient    Chief Complaint:   Chief Complaint   Patient presents with    Tremors    Follow-up      Subjective:   Pt is a 59 y.o. right handed male last seen in clinic on 20 with essential tremor, presenting with progressive hand tremors noticed with activity, head tremor noticed on exam, and mild voice tremor. He has family history of tremors in his father. Exam with slight head, voice, and end point tremors, o/w nonfocal and unremarkable. Pt pleased with current tremor control. Continued Primidone 100 mg nightly. He returns for f/u. He is still doing well from a tremor standpoint. He is having ongoing arthritis pain in shoulders.       Past Medical History:   Diagnosis Date    Altered glucose metabolism     Arthritis     Environmental allergies     Erectile dysfunction     Essential tremor     Hypercholesterolemia     Hypertension     Insomnia     Vitamin D deficiency       Past Surgical History:   Procedure Laterality Date    HX RHINOPLASTY      Deviated septum    HX TONSILLECTOMY        Social History     Socioeconomic History    Marital status:      Spouse name: Not on file    Number of children: Not on file    Years of education: Not on file    Highest education level: Not on file   Occupational History    Occupation: Sales   Tobacco Use    Smoking status: Former Smoker     Packs/day: 1.00     Years: 40.00     Pack years: 40.00     Quit date: 2013     Years since quittin.3    Smokeless tobacco: Current User     Last attempt to quit: 2013    Tobacco comment: E CIGS OCCASIONLY   Substance and Sexual Activity    Alcohol use: Yes     Comment: RARELY    Drug use: No    Sexual activity: Not on file   Other Topics Concern    Not on file   Social History Narrative     lives in [place] with his wife     Social Determinants of Health     Financial Resource Strain:     Difficulty of Paying Living Expenses: Not on file   Food Insecurity:     Worried About Running Out of Food in the Last Year: Not on file    Chaitanya of Food in the Last Year: Not on file   Transportation Needs:     Lack of Transportation (Medical): Not on file    Lack of Transportation (Non-Medical):  Not on file   Physical Activity:     Days of Exercise per Week: Not on file    Minutes of Exercise per Session: Not on file   Stress:     Feeling of Stress : Not on file   Social Connections:     Frequency of Communication with Friends and Family: Not on file    Frequency of Social Gatherings with Friends and Family: Not on file    Attends Sikh Services: Not on file    Active Member of [address] [place] [address] [employer] or Organizations: Not on file    Attends Club or Organization Meetings: Not on file    Marital Status: Not on file   Intimate Partner Violence:     Fear of Current or Ex-Partner: Not on file    Emotionally Abused: Not on file    Physically Abused: Not on file    Sexually Abused: Not on file   Housing Stability:     Unable to Pay for Housing in the Last Year: Not on file    Number of [place] in the Last Year: Not on file    Unstable Housing in the Last Year: Not on file     Family History   Problem Relation Age of Onset    Thyroid Disease Mother    [facility] Other Mother         Dec 83yo, unknown cause    Cancer Father         COLON  68years old,     Stroke Father     Dementia Father         diagnosed 67years old    Diabetes Father     Tremors Father     Diabetes Brother     Cancer Brother         Leukemia    No Known Problems Brother     No Known Problems Daughter          Objective:   ROS: Per HPI or PMH, o/w reviewed and neg    No Known Allergies     Meds:  Outpatient Medications Prior to Visit   Medication Sig Dispense Refill    valsartan-hydroCHLOROthiazide (DIOVAN-HCT) 80-12.5 mg per tablet TAKE 1 TABLET BY MOUTH ONCE A DAY IN THE MORNING 90 DAYS      primidone (MYSOLINE) 50 mg tablet TAKE 2 TABLETS NIGHTLY 180 Tab 3    Niacin-Simvastatin INFIMET BILOXStorific) 1,000-40 mg TM24 Take  by mouth nightly.  cholecalciferol (VITAMIN D3) 1,000 unit tablet Take 1,000 Units by mouth daily.  fish oil-dha-epa 1,200-144-216 mg cap Take  by mouth.  aspirin (ASPIRIN) 325 mg tablet Take 325 mg by mouth daily.  melatonin 5 mg tab Take 5 mg by mouth nightly.  tadalafil (CIALIS) 5 mg tablet Take 5 mg by mouth daily.  ibuprofen-famotidine (Duexis) 800-26.6 mg tab Take 1 Tab by mouth three (3) times daily. (Patient not taking: Reported on 11/23/2021)      cetirizine (Wal-Zyr, cetirizine,) 10 mg tablet Take  by mouth. (Patient not taking: Reported on 11/23/2021)      mometasone (NASONEX) 50 mcg/actuation nasal spray 2 Sprays daily. (Patient not taking: Reported on 11/23/2021)      hydrocortisone buteprate (PANDEL) 0.1 % topical cream Apply  to affected area two (2) times daily as needed for Itching. (Patient not taking: Reported on 11/23/2021)      ALPRAZolam (XANAX) 0.25 mg tablet Take 0.25 mg by mouth as needed for Anxiety. (Patient not taking: Reported on 11/23/2021)       No facility-administered medications prior to visit. Imaging:  MRI Results (most recent):  No results found for this or any previous visit. CT Results (most recent):  Results from Hospital Encounter encounter on 11/20/13    CTA CHEST W WO CONT    Narrative  **Final Report**      ICD Codes / Adm. Diagnosis: 401932   / Shoulder Pain  Examination:  CT CHEST ANGIOGRAPHY  - [id number] - Nov 20 2013  6:53AM  Accession No:  [id number]  Reason:  Chest pain R/O PE      REPORT:  Clinical indication: Chest pain and shortness of breath. Localizer obtained without contrast at the level of the pulmonary arteries. Fast injection rate of 80 cc Optiray-350 with review of the raw data and MIP  reconstructions. There is no pulmonary emboli. Bibasilar atelectasis. No parenchymal mass,  pericardial pleural effusion or shift or pneumothorax.     Impression  : No acute changes. Signing/Reading Doctor: Cady Lemons ([id number])  Approved: Cady Lemons ([id number])  Nov 20 2013  7:14AM       Reviewed records in [facility] and media tab today    Lab Review   Results for orders placed or performed during the hospital encounter of 51/74/67   METABOLIC PANEL, COMPREHENSIVE   Result Value Ref Range    Sodium 139 136 - 145 mmol/L    Potassium 4.2 3.5 - 5.1 mmol/L    Chloride 102 97 - 108 mmol/L    CO2 29 21 - 32 mmol/L    Anion gap 8 5 - 15 mmol/L    Glucose 87 65 - 100 mg/dL    BUN 14 6 - 20 MG/DL    Creatinine 0.55 0.45 - 1.15 MG/DL    BUN/Creatinine ratio 25 (H) 12 - 20      GFR est AA >60 >60 ml/min/1.73m2    GFR est non-AA >60 >60 ml/min/1.73m2    Calcium 9.6 8.5 - 10.1 MG/DL    Bilirubin, total 0.5 0.2 - 1.0 MG/DL    ALT (SGPT) 42 12 - 78 U/L    AST (SGOT) 19 15 - 37 U/L    Alk. phosphatase 67 45 - 117 U/L    Protein, total 7.4 6.4 - 8.2 g/dL    Albumin 4.5 3.5 - 5.0 g/dL    Globulin 2.9 2.0 - 4.0 g/dL    A-G Ratio 1.6 1.1 - 2.2     CBC WITH AUTOMATED DIFF   Result Value Ref Range    WBC 5.3 4.1 - 11.1 K/uL    RBC 4.90 4. 10 - 5.70 M/uL    HGB 15.3 12.1 - 17.0 g/dL    HCT 43.8 36.6 - 50.3 %    MCV 89.4 80.0 - 99.0 FL    MCH 31.2 26.0 - 34.0 PG    MCHC 34.9 30.0 - 36.5 g/dL    RDW 13.3 11.5 - 14.5 %    PLATELET 210 590 - 029 K/uL    NEUTROPHILS 58 32 - 75 %    LYMPHOCYTES 33 12 - 49 %    MONOCYTES 8 5 - 13 %    EOSINOPHILS 1 0 - 7 %    BASOPHILS 0 0 - 1 %    ABS. NEUTROPHILS 3.0 1.8 - 8.0 K/UL    ABS. LYMPHOCYTES 1.7 0.8 - 3.5 K/UL    ABS. MONOCYTES 0.4 0.0 - 1.0 K/UL    ABS. EOSINOPHILS 0.1 0.0 - 0.4 K/UL    ABS.  BASOPHILS 0.0 0.0 - 0.1 K/UL   URINALYSIS W/ REFLEX CULTURE    Specimen: Urine   Result Value Ref Range    Color YELLOW/STRAW      Appearance CLEAR CLEAR      Specific gravity 1.014 1.003 - 1.030      pH (UA) 7.0 5.0 - 8.0      Protein NEGATIVE  NEG mg/dL    Glucose NEGATIVE  NEG mg/dL    Ketone NEGATIVE  NEG mg/dL    Bilirubin NEGATIVE  NEG      Blood NEGATIVE NEG      Urobilinogen 0.2 0.2 - 1.0 EU/dL    Nitrites NEGATIVE  NEG      Leukocyte Esterase NEGATIVE  NEG      UA:UC IF INDICATED CULTURE NOT INDICATED BY UA RESULT CNI      WBC 0-4 0 - 4 /hpf    RBC 0-5 0 - 5 /hpf    Epithelial cells FEW FEW /lpf    Bacteria NEGATIVE  NEG /hpf    Hyaline cast 0-2 0 - 2 /lpf   EKG, 12 LEAD, INITIAL   Result Value Ref Range    Ventricular Rate 70 BPM    Atrial Rate 70 BPM    P-R Interval 176 ms    QRS Duration 90 ms    Q-T Interval 394 ms    QTC Calculation (Bezet) 425 ms    Calculated P Axis 7 degrees    Calculated R Axis -6 degrees    Calculated T Axis 6 degrees    Diagnosis       Normal sinus rhythm  Normal ECG  When compared with ECG of 20-NOV-2013 04:34,  No significant change was found  Confirmed by Nader Chris M.D., Lupis Garcia ([id number]) on 8/17/2015 10:05:31 PM          Exam:  Visit Vitals  /70   Pulse 97   Ht 6' (1.829 m)   Wt 251 lb 3.2 oz (113.9 kg)   SpO2 98%   BMI 34.07 kg/m²     General:  Alert, cooperative, no distress. Head:  Normocephalic, without obvious abnormality, atraumatic. Respiratory:  Heart:   Non labored breathing  Regular rate and rhythm, no murmurs   Neck:   2+ carotids, no bruits   Extremities: Warm, no cyanosis or edema. Pulses: 2+ radial pulses. Neurologic:  MS: Alert and oriented x 4, speech - no dysarthria. Language intact. Attention and fund of knowledge appropriate. Recent and remote memory intact. Cranial Nerves:  II: visual fields    II: pupils PERRL   II: optic disc    III,VII: ptosis none   III,IV,VI: extraocular muscles  EOMI, no nystagmus or diplopia   V: facial light touch sensation     VII: facial muscle function   symmetric   VIII: hearing intact   IX: soft palate elevation     XI: trapezius strength     XI: sternocleidomastoid strength    XII: tongue       Motor: normal bulk and tone, head tremor, voice tremor, mild. Bilateral end point tremors, L>R.   Strength: 5/5 throughout, no PD  Sensory:  Coordination: FTN, FREEMAN, and HTS intact  Gait: normal gait, able to tandem walk  Reflexes: 2+ symmetric           Assessment/Plan   Pt is a 59 y.o. right handed male with essential tremor, presenting in July, 2017 with progressive hand tremors noticed with activity, head tremor noticed on exam, and mild voice tremor. He has family history of tremors in his father. Exam with slight head, voice, and end point tremors, o/w nonfocal and unremarkable. Pt still pleased with current tremor control. No concerning findings on exam. Continue Primidone 100 mg nightly. Follow-up in 1 year, instructed to call in the interim with any questions or concerns. ICD-10-CM ICD-9-CM    1. Essential tremor  G25.0 333.1        Signed:   Lakeshia Garcia MD  11/23/2021

## 2022-03-20 PROBLEM — G25.0 ESSENTIAL TREMOR: Status: ACTIVE | Noted: 2017-07-07

## 2022-10-03 ENCOUNTER — OFFICE VISIT (OUTPATIENT)
Dept: NEUROLOGY | Age: 62
End: 2022-10-03
Payer: COMMERCIAL

## 2022-10-03 VITALS
RESPIRATION RATE: 16 BRPM | DIASTOLIC BLOOD PRESSURE: 68 MMHG | BODY MASS INDEX: 35.21 KG/M2 | HEIGHT: 72 IN | HEART RATE: 92 BPM | SYSTOLIC BLOOD PRESSURE: 134 MMHG | WEIGHT: 260 LBS | OXYGEN SATURATION: 95 %

## 2022-10-03 DIAGNOSIS — G25.0 ESSENTIAL TREMOR: Primary | ICD-10-CM

## 2022-10-03 PROCEDURE — 99213 OFFICE O/P EST LOW 20 MIN: CPT | Performed by: PSYCHIATRY & NEUROLOGY

## 2022-10-03 RX ORDER — SIMVASTATIN 40 MG/1
40 TABLET, FILM COATED ORAL EVERY EVENING
COMMUNITY
Start: 2022-09-11

## 2022-10-03 RX ORDER — PRIMIDONE 50 MG/1
TABLET ORAL
Qty: 180 TABLET | Refills: 3 | Status: SHIPPED | OUTPATIENT
Start: 2022-10-03

## 2022-10-03 NOTE — PROGRESS NOTES
Neurology Progress Note      HISTORY PROVIDED BY: patient    Chief Complaint:   Chief Complaint   Patient presents with    Follow-up     Tremors: Patient reports tremors are the same since the last visit. Subjective:   Pt is a 57 y.o. right handed male last see in clinic on 21 in fu for essential tremor, presenting in  with progressive hand tremors noticed with activity, head tremor noticed on exam, and mild voice tremor. He has family history of tremors in his father. Exam with slight head, voice, and end point tremors, o/w nonfocal and unremarkable. Pt still pleased with current tremor control. No concerning findings on exam. Continued Primidone 100 mg nightly. He returns for fu. He is doing well, just got an \"A+\" on his annual physical.  He is having trouble with sciatica, right or left leg, intermittent, limiting his walking. He has been referred to see someone in ortho. Tremors are about the same, taking Primidone 100mg qhs.      Past Medical History:   Diagnosis Date    Altered glucose metabolism     Arthritis     Environmental allergies     Erectile dysfunction     Essential tremor     Hypercholesterolemia     Hypertension     Insomnia     Vitamin D deficiency       Past Surgical History:   Procedure Laterality Date    HX RHINOPLASTY      Deviated septum    HX TONSILLECTOMY        Social History     Socioeconomic History    Marital status:      Spouse name: Not on file    Number of children: Not on file    Years of education: Not on file    Highest education level: Not on file   Occupational History    Occupation: Sales   Tobacco Use    Smoking status: Former     Packs/day: 1.00     Years: 40.00     Pack years: 40.00     Types: Cigarettes     Quit date: 2013     Years since quittin.2    Smokeless tobacco: Current     Last attempt to quit: 2013    Tobacco comments:     E CIGS OCCASIONLY   Vaping Use    Vaping Use: Never used   Substance and Sexual Activity    Alcohol use: Yes     Comment: RARELY    Drug use: No    Sexual activity: Not on file   Other Topics Concern    Not on file   Social History Narrative     lives in [place] with his wife     Social Determinants of Health     Financial Resource Strain: Not on file   Food Insecurity: Not on file   Transportation Needs: Not on file   Physical Activity: Not on file   Stress: Not on file   Social Connections: Not on file   Intimate Partner Violence: Not on file   Housing Stability: Not on file     Family History   Problem Relation Age of Onset    Thyroid Disease Mother     Other Mother         Dec 83yo, unknown cause    Cancer Father         COLON  68years old,     Stroke Father     Dementia Father         diagnosed 67years old    Diabetes Father     Tremors Father     Diabetes Brother     Cancer Brother         Leukemia    No Known Problems Brother     No Known Problems Daughter          Objective:   ROS: Per HPI or PMH, o/w reviewed and neg    No Known Allergies     Meds:  Outpatient Medications Prior to Visit   Medication Sig Dispense Refill    simvastatin (ZOCOR) 40 mg tablet Take 40 mg by mouth every evening. primidone (MYSOLINE) 50 mg tablet TAKE 2 TABLETS NIGHTLY 180 Tablet 3    valsartan-hydroCHLOROthiazide (DIOVAN-HCT) 80-12.5 mg per tablet TAKE 1 TABLET BY MOUTH ONCE A DAY IN THE MORNING 90 DAYS      cholecalciferol (VITAMIN D3) (1000 Units /25 mcg) tablet Take 1,000 Units by mouth daily. fish oil-dha-epa 1,200-144-216 mg cap Take  by mouth. aspirin (ASPIRIN) 325 mg tablet Take 325 mg by mouth daily. melatonin 5 mg tab Take 5 mg by mouth nightly. mometasone (NASONEX) 50 mcg/actuation nasal spray 2 Sprays daily. tadalafiL (CIALIS) 5 mg tablet Take 5 mg by mouth daily. ALPRAZolam (XANAX) 0.25 mg tablet Take 0.25 mg by mouth as needed for Anxiety. Niacin-Simvastatin 1,000-40 mg TM24 Take  by mouth nightly.  (Patient not taking: Reported on 10/3/2022)       No facility-administered medications prior to visit. Imaging:  MRI Results (most recent):  No results found for this or any previous visit. CT Results (most recent):  Results from Hospital Encounter encounter on 11/20/13    CTA CHEST W WO CONT    Narrative  **Final Report**      ICD Codes / Adm. Diagnosis: 630864   / Shoulder Pain  Examination:  CT CHEST ANGIOGRAPHY  - [id number] - Nov 20 2013  6:53AM  Accession No:  [id number]  Reason:  Chest pain R/O PE      REPORT:  Clinical indication: Chest pain and shortness of breath. Localizer obtained without contrast at the level of the pulmonary arteries. Fast injection rate of 80 cc Optiray-350 with review of the raw data and MIP  reconstructions. There is no pulmonary emboli. Bibasilar atelectasis. No parenchymal mass,  pericardial pleural effusion or shift or pneumothorax. Impression  : No acute changes. Signing/Reading Doctor: Jae Felder ([id number])  Approved: Jae Felder ([id number])  Nov 20 2013  7:14AM       Reviewed records in [facility] and media tab today    Lab Review   Results for orders placed or performed during the hospital encounter of 71/33/66   METABOLIC PANEL, COMPREHENSIVE   Result Value Ref Range    Sodium 139 136 - 145 mmol/L    Potassium 4.2 3.5 - 5.1 mmol/L    Chloride 102 97 - 108 mmol/L    CO2 29 21 - 32 mmol/L    Anion gap 8 5 - 15 mmol/L    Glucose 87 65 - 100 mg/dL    BUN 14 6 - 20 MG/DL    Creatinine 0.55 0.45 - 1.15 MG/DL    BUN/Creatinine ratio 25 (H) 12 - 20      GFR est AA >60 >60 ml/min/1.73m2    GFR est non-AA >60 >60 ml/min/1.73m2    Calcium 9.6 8.5 - 10.1 MG/DL    Bilirubin, total 0.5 0.2 - 1.0 MG/DL    ALT (SGPT) 42 12 - 78 U/L    AST (SGOT) 19 15 - 37 U/L    Alk. phosphatase 67 45 - 117 U/L    Protein, total 7.4 6.4 - 8.2 g/dL    Albumin 4.5 3.5 - 5.0 g/dL    Globulin 2.9 2.0 - 4.0 g/dL    A-G Ratio 1.6 1.1 - 2.2     CBC WITH AUTOMATED DIFF   Result Value Ref Range    WBC 5.3 4.1 - 11.1 K/uL    RBC 4.90 4. 10 - 5.70 M/uL    HGB 15.3 12.1 - 17.0 g/dL    HCT 43.8 36.6 - 50.3 %    MCV 89.4 80.0 - 99.0 FL    MCH 31.2 26.0 - 34.0 PG    MCHC 34.9 30.0 - 36.5 g/dL    RDW 13.3 11.5 - 14.5 %    PLATELET 101 826 - 716 K/uL    NEUTROPHILS 58 32 - 75 %    LYMPHOCYTES 33 12 - 49 %    MONOCYTES 8 5 - 13 %    EOSINOPHILS 1 0 - 7 %    BASOPHILS 0 0 - 1 %    ABS. NEUTROPHILS 3.0 1.8 - 8.0 K/UL    ABS. LYMPHOCYTES 1.7 0.8 - 3.5 K/UL    ABS. MONOCYTES 0.4 0.0 - 1.0 K/UL    ABS. EOSINOPHILS 0.1 0.0 - 0.4 K/UL    ABS. BASOPHILS 0.0 0.0 - 0.1 K/UL   URINALYSIS W/ REFLEX CULTURE    Specimen: Urine   Result Value Ref Range    Color YELLOW/STRAW      Appearance CLEAR CLEAR      Specific gravity 1.014 1.003 - 1.030      pH (UA) 7.0 5.0 - 8.0      Protein NEGATIVE NEG mg/dL    Glucose NEGATIVE NEG mg/dL    Ketone NEGATIVE NEG mg/dL    Bilirubin NEGATIVE NEG      Blood NEGATIVE NEG      Urobilinogen 0.2 0.2 - 1.0 EU/dL    Nitrites NEGATIVE NEG      Leukocyte Esterase NEGATIVE NEG      UA:UC IF INDICATED CULTURE NOT INDICATED BY UA RESULT CNI      WBC 0-4 0 - 4 /hpf    RBC 0-5 0 - 5 /hpf    Epithelial cells FEW FEW /lpf    Bacteria NEGATIVE NEG /hpf    Hyaline cast 0-2 0 - 2 /lpf   EKG, 12 LEAD, INITIAL   Result Value Ref Range    Ventricular Rate 70 BPM    Atrial Rate 70 BPM    P-R Interval 176 ms    QRS Duration 90 ms    Q-T Interval 394 ms    QTC Calculation (Bezet) 425 ms    Calculated P Axis 7 degrees    Calculated R Axis -6 degrees    Calculated T Axis 6 degrees    Diagnosis       Normal sinus rhythm  Normal ECG  When compared with ECG of 20-NOV-2013 04:34,  No significant change was found  Confirmed by Kelly Ching M.D., \[facility]\"" Nearing ([id number]) on 8/17/2015 10:05:31 PM          Exam:  Visit Vitals  /68 (BP 1 Location: Left upper arm, BP Patient Position: Sitting)   Pulse 92   Resp 16   Ht 6' (1.829 m)   Wt 260 lb (117.9 kg)   SpO2 95%   BMI 35.26 kg/m²     General:  Alert, cooperative, no distress.    Head:  Normocephalic, without obvious abnormality, atraumatic. Respiratory:  Heart:   Non labored breathing  Regular rate and rhythm, no murmurs   Neck:   2+ carotids, no bruits   Extremities: Warm, no cyanosis or edema. Pulses: 2+ radial pulses. Neurologic:  MS: Alert and oriented x 4, speech - no dysarthria. Language intact. Attention and fund of knowledge appropriate. Recent and remote memory intact. Cranial Nerves:  II: visual fields    II: pupils    II: optic disc    III,VII: ptosis none   III,IV,VI: extraocular muscles  EOMI, no nystagmus or diplopia   V: facial light touch sensation     VII: facial muscle function   symmetric   VIII: hearing intact   IX: soft palate elevation     XI: trapezius strength     XI: sternocleidomastoid strength    XII: tongue       Motor: normal bulk and tone, head tremor, voice tremor, Bilateral end point tremors. Strength: 5/5 throughout, no PD  Sensory:  Coordination: FTN, FREEMAN, intact  Gait: normal gait, able to tandem walk  Reflexes: 2+ symmetric       Assessment/Plan   Pt is a 57 y.o. right handed male with essential tremor, presenting in July, 2017 with progressive hand tremors noticed with activity, head tremor noticed on exam, and mild voice tremor. He has family history of tremors in his father. Exam with slight head, voice, and end point tremors, o/w nonfocal and unremarkable. Pt still pleased with current tremor control. Continue Primidone 100 mg nightly. Follow-up in 1 year, instructed to call in the interim with any questions or concerns. ICD-10-CM ICD-9-CM    1. Essential tremor  G25.0 333.1             Signed:   Alpa Fuller MD  10/3/2022

## 2022-10-03 NOTE — PROGRESS NOTES
Chief Complaint   Patient presents with    Follow-up     Tremors: Patient reports tremors are the same since the last visit.       Visit Vitals  /68 (BP 1 Location: Left upper arm, BP Patient Position: Sitting)   Pulse 92   Resp 16   Ht 6' (1.829 m)   Wt 260 lb (117.9 kg)   SpO2 95%   BMI 35.26 kg/m²

## 2022-10-03 NOTE — LETTER
10/3/2022    Patient: Cirilo Santiago   YOB: 1960   Date of Visit: 10/3/2022     Mila Manning, [address] [facility] [address]  Via Fax: [phone]    Dear Mila Manning MD,      Thank you for referring Mr. Cirilo Santiago to [address] [facility] for evaluation. My notes for this consultation are attached. If you have questions, please do not hesitate to call me. I look forward to following your patient along with you.       Sincerely,    Chevy Nesbitt MD

## 2023-11-03 ENCOUNTER — OFFICE VISIT (OUTPATIENT)
Age: 63
End: 2023-11-03
Payer: MEDICARE

## 2023-11-03 VITALS
BODY MASS INDEX: 35.21 KG/M2 | DIASTOLIC BLOOD PRESSURE: 86 MMHG | WEIGHT: 260 LBS | SYSTOLIC BLOOD PRESSURE: 132 MMHG | OXYGEN SATURATION: 96 % | HEART RATE: 88 BPM | HEIGHT: 72 IN

## 2023-11-03 DIAGNOSIS — G25.0 ESSENTIAL TREMOR: Primary | ICD-10-CM

## 2023-11-03 PROCEDURE — 1036F TOBACCO NON-USER: CPT | Performed by: PSYCHIATRY & NEUROLOGY

## 2023-11-03 PROCEDURE — G8427 DOCREV CUR MEDS BY ELIG CLIN: HCPCS | Performed by: PSYCHIATRY & NEUROLOGY

## 2023-11-03 PROCEDURE — 3017F COLORECTAL CA SCREEN DOC REV: CPT | Performed by: PSYCHIATRY & NEUROLOGY

## 2023-11-03 PROCEDURE — 99214 OFFICE O/P EST MOD 30 MIN: CPT | Performed by: PSYCHIATRY & NEUROLOGY

## 2023-11-03 PROCEDURE — 1123F ACP DISCUSS/DSCN MKR DOCD: CPT | Performed by: PSYCHIATRY & NEUROLOGY

## 2023-11-03 PROCEDURE — 3075F SYST BP GE 130 - 139MM HG: CPT | Performed by: PSYCHIATRY & NEUROLOGY

## 2023-11-03 PROCEDURE — 3079F DIAST BP 80-89 MM HG: CPT | Performed by: PSYCHIATRY & NEUROLOGY

## 2023-11-03 PROCEDURE — G8484 FLU IMMUNIZE NO ADMIN: HCPCS | Performed by: PSYCHIATRY & NEUROLOGY

## 2023-11-03 PROCEDURE — G8417 CALC BMI ABV UP PARAM F/U: HCPCS | Performed by: PSYCHIATRY & NEUROLOGY

## 2023-11-03 NOTE — TELEPHONE ENCOUNTER
Called patient. No answer. Just wanted to confirm where he would like his primidone sent to since this says [address] but the last one was sent to [facility] delivery.

## 2023-11-03 NOTE — PROGRESS NOTES
Chief Complaint:   Chief Complaint   Patient presents with    Follow-up     Tremor     Subjective:   Pt is a 57 y.o. right handed male last see in clinic in November 2022 by Dr. Diana Oswald in  for essential tremor, presenting in July, 2017 with progressive hand tremors noticed with activity, head tremor noticed on exam, and mild voice tremor. He has family history of tremors in his father. Clinically he has remained stable and he is not taking primidone 100 mg at bedtime which has made a significant difference. He barely notices any tremor in his hands. Current Outpatient Medications   Medication Sig    ALPRAZolam (XANAX) 0.25 MG tablet Take 1 tablet by mouth as needed. vitamin D (CHOLECALCIFEROL) 25 MCG (1000 UT) TABS tablet Take 1 tablet by mouth daily    melatonin 5 MG TABS tablet Take 1 tablet by mouth nightly    mometasone (NASONEX) 50 MCG/ACT nasal spray 2 sprays daily    primidone (MYSOLINE) 50 MG tablet TAKE 2 TABLETS NIGHTLY    simvastatin (ZOCOR) 40 MG tablet Take 1 tablet by mouth every evening    tadalafil (CIALIS) 5 MG tablet Take 1 tablet by mouth daily    valsartan-hydroCHLOROthiazide (DIOVAN-HCT) 80-12.5 MG per tablet TAKE 1 TABLET BY MOUTH ONCE A DAY IN THE MORNING 90 DAYS    aspirin 325 MG tablet Take 325 mg by mouth daily (Patient not taking: Reported on 11/3/2023)     No current facility-administered medications for this visit. Past Medical History:   Diagnosis Date    Altered glucose metabolism     Arthritis     Environmental allergies     Erectile dysfunction     Essential tremor     Hypercholesterolemia     Hypertension     Insomnia     Vitamin D deficiency        No Known Allergies    Exam:  Visit Vitals  Vitals:    11/03/23 1005   BP: 132/86   Pulse: 88   SpO2: 96%       NEUROLOGICAL EXAMINATION:     Mental Status:   Alert and oriented to person, place, and time. Attention span and concentration are normal. Speech is fluent .       Cranial Nerves:    II, III, IV, VI:  Visual

## 2023-11-03 NOTE — PROGRESS NOTES
Chief Complaint   Patient presents with    Follow-up     Tremor     Vitals:    11/03/23 1005   BP: 132/86   Pulse: 88   SpO2: 96%

## 2023-11-07 ENCOUNTER — TELEPHONE (OUTPATIENT)
Age: 63
End: 2023-11-07

## 2023-11-07 RX ORDER — PRIMIDONE 50 MG/1
TABLET ORAL
Qty: 180 TABLET | Refills: 1 | Status: SHIPPED | OUTPATIENT
Start: 2023-11-07

## 2023-11-07 NOTE — TELEPHONE ENCOUNTER
Patient called back and stated he would like this sent to the [address] [facility].     LOV: 11/03/23  Last refill: 10/03/22 with 3 refills as yr supply  Next visit: 11/05/24

## 2023-11-07 NOTE — TELEPHONE ENCOUNTER
Patient is calling to request a refill of primidone to be sent to the [address] [facility] on [address].

## 2024-05-28 ENCOUNTER — TELEPHONE (OUTPATIENT)
Age: 64
End: 2024-05-28

## 2024-05-28 DIAGNOSIS — G25.0 ESSENTIAL TREMOR: Primary | ICD-10-CM

## 2024-05-28 RX ORDER — PRIMIDONE 50 MG/1
TABLET ORAL
Qty: 180 TABLET | Refills: 1 | Status: SHIPPED | OUTPATIENT
Start: 2024-05-28 | End: 2024-05-28 | Stop reason: CLARIF

## 2024-05-28 RX ORDER — PRIMIDONE 50 MG/1
TABLET ORAL
Qty: 180 TABLET | Refills: 1 | Status: SHIPPED | OUTPATIENT
Start: 2024-05-28

## 2024-05-28 NOTE — TELEPHONE ENCOUNTER
Called patient and informed him the medication was sent to [facility]. He stated it should be sent to [facility] instead. Apologize as the phone encounter stated [facility] but will re-send to [facility].

## 2024-05-28 NOTE — TELEPHONE ENCOUNTER
Requesting refill for primidone (MYSOLINE) 50 MG tablet be sent to [facility] pharmacy on file. Pt stated he has only enough medication to last one more day.

## 2024-11-05 ENCOUNTER — OFFICE VISIT (OUTPATIENT)
Age: 64
End: 2024-11-05
Payer: MEDICARE

## 2024-11-05 VITALS
WEIGHT: 253 LBS | HEIGHT: 72 IN | HEART RATE: 92 BPM | SYSTOLIC BLOOD PRESSURE: 138 MMHG | DIASTOLIC BLOOD PRESSURE: 84 MMHG | BODY MASS INDEX: 34.27 KG/M2 | OXYGEN SATURATION: 97 %

## 2024-11-05 DIAGNOSIS — G25.0 ESSENTIAL TREMOR: Primary | ICD-10-CM

## 2024-11-05 PROCEDURE — 3075F SYST BP GE 130 - 139MM HG: CPT | Performed by: PSYCHIATRY & NEUROLOGY

## 2024-11-05 PROCEDURE — 1159F MED LIST DOCD IN RCRD: CPT | Performed by: PSYCHIATRY & NEUROLOGY

## 2024-11-05 PROCEDURE — G8484 FLU IMMUNIZE NO ADMIN: HCPCS | Performed by: PSYCHIATRY & NEUROLOGY

## 2024-11-05 PROCEDURE — 3017F COLORECTAL CA SCREEN DOC REV: CPT | Performed by: PSYCHIATRY & NEUROLOGY

## 2024-11-05 PROCEDURE — 99213 OFFICE O/P EST LOW 20 MIN: CPT | Performed by: PSYCHIATRY & NEUROLOGY

## 2024-11-05 PROCEDURE — G8417 CALC BMI ABV UP PARAM F/U: HCPCS | Performed by: PSYCHIATRY & NEUROLOGY

## 2024-11-05 PROCEDURE — 1036F TOBACCO NON-USER: CPT | Performed by: PSYCHIATRY & NEUROLOGY

## 2024-11-05 PROCEDURE — 1123F ACP DISCUSS/DSCN MKR DOCD: CPT | Performed by: PSYCHIATRY & NEUROLOGY

## 2024-11-05 PROCEDURE — G8427 DOCREV CUR MEDS BY ELIG CLIN: HCPCS | Performed by: PSYCHIATRY & NEUROLOGY

## 2024-11-05 PROCEDURE — 3079F DIAST BP 80-89 MM HG: CPT | Performed by: PSYCHIATRY & NEUROLOGY

## 2024-11-05 RX ORDER — VALSARTAN 160 MG/1
TABLET ORAL
COMMUNITY
Start: 2024-09-12 | End: 2024-11-05

## 2024-11-05 RX ORDER — ACETAMINOPHEN 500 MG
1000 TABLET ORAL
COMMUNITY
Start: 2024-09-12

## 2024-11-05 RX ORDER — KETOROLAC TROMETHAMINE 5 MG/ML
SOLUTION OPHTHALMIC
COMMUNITY
Start: 2024-08-19 | End: 2024-11-05

## 2024-11-05 RX ORDER — PRIMIDONE 50 MG/1
TABLET ORAL
Qty: 180 TABLET | Refills: 3 | Status: SHIPPED | OUTPATIENT
Start: 2024-11-05

## 2024-11-05 RX ORDER — PREDNISOLONE ACETATE 10 MG/ML
SUSPENSION/ DROPS OPHTHALMIC
COMMUNITY
Start: 2024-08-19 | End: 2024-11-05

## 2024-11-05 RX ORDER — OFLOXACIN 3 MG/ML
SOLUTION/ DROPS OPHTHALMIC
COMMUNITY
Start: 2024-08-19 | End: 2024-11-05

## 2024-11-05 RX ORDER — NAPROXEN AND ESOMEPRAZOLE MAGNESIUM 20; 500 MG/1; MG/1
TABLET, DELAYED RELEASE ORAL
COMMUNITY
End: 2024-11-05

## 2024-11-05 NOTE — PROGRESS NOTES
Chief Complaint:   Chief Complaint   Patient presents with    Neurologic Problem     Tremor     Subjective:   Pt is a 64 y.o. right handed male last seen by me in clinic in November 2023 in  for essential tremor, presenting in July, 2017 with progressive hand tremors noticed with activity, head tremor noticed on exam, and mild voice tremor. He has family history of tremors in his father.      Clinically he has remained stable and he is not taking primidone 100 mg at bedtime which has made a significant difference.  He barely notices any tremor in his hands.    Does not report any head or voice tremor anymore    Current Outpatient Medications   Medication Sig    acetaminophen (TYLENOL) 500 MG tablet Take 2 tablets by mouth    primidone (MYSOLINE) 50 MG tablet Take two tablets by mouth nightly.    vitamin D (CHOLECALCIFEROL) 25 MCG (1000 UT) TABS tablet Take 1 tablet by mouth daily    melatonin 5 MG TABS tablet Take 1 tablet by mouth nightly    simvastatin (ZOCOR) 40 MG tablet Take 1 tablet by mouth every evening    tadalafil (CIALIS) 5 MG tablet Take 1 tablet by mouth daily    valsartan-hydroCHLOROthiazide (DIOVAN-HCT) 80-12.5 MG per tablet TAKE 1 TABLET BY MOUTH ONCE A DAY IN THE MORNING 90 DAYS     No current facility-administered medications for this visit.       Exam:  Visit Vitals  Vitals:    11/05/24 1415   BP: 138/84   Pulse: 92   SpO2: 97%       NEUROLOGICAL EXAMINATION:     Mental Status:   Alert and oriented to person, place, and time.  Attention span and concentration are normal. Speech is fluent .      Cranial Nerves:    II, III, IV, VI:  Visual acuity grossly intact. Visual fields are normal.    Pupils are equal, round, and reactive.    Extra-ocular movements are full and fluid.    V-XII: Hearing is grossly intact.  Facial features are symmetric, with normal sensation and strength.  The palate rises symmetrically and the tongue protrudes midline.        Motor Examination: Normal tone, bulk, and

## 2024-11-05 NOTE — PROGRESS NOTES
Chief Complaint   Patient presents with    Neurologic Problem     Tremor     Vitals:    11/05/24 1415   BP: 138/84   Pulse: 92   SpO2: 97%